# Patient Record
Sex: MALE | Race: WHITE | NOT HISPANIC OR LATINO | Employment: FULL TIME | URBAN - METROPOLITAN AREA
[De-identification: names, ages, dates, MRNs, and addresses within clinical notes are randomized per-mention and may not be internally consistent; named-entity substitution may affect disease eponyms.]

---

## 2017-06-04 ENCOUNTER — HOSPITAL ENCOUNTER (OUTPATIENT)
Dept: RADIOLOGY | Facility: HOSPITAL | Age: 42
Discharge: HOME | End: 2017-06-04
Attending: NURSE PRACTITIONER
Payer: COMMERCIAL

## 2017-06-04 ENCOUNTER — OFFICE VISIT (OUTPATIENT)
Dept: INTERNAL MEDICINE | Facility: CLINIC | Age: 42
End: 2017-06-04
Payer: COMMERCIAL

## 2017-06-04 VITALS
TEMPERATURE: 99 F | HEIGHT: 78 IN | DIASTOLIC BLOOD PRESSURE: 78 MMHG | SYSTOLIC BLOOD PRESSURE: 133 MMHG | HEART RATE: 69 BPM | WEIGHT: 242.5 LBS | BODY MASS INDEX: 28.06 KG/M2

## 2017-06-04 DIAGNOSIS — A08.4 VIRAL GASTROENTERITIS: ICD-10-CM

## 2017-06-04 DIAGNOSIS — A08.4 VIRAL GASTROENTERITIS: Primary | ICD-10-CM

## 2017-06-04 LAB
BILIRUB SERPL-MCNC: NORMAL MG/DL
BLOOD URINE, POC: NORMAL
COLOR, POC UA: NORMAL
GLUCOSE UR QL STRIP: NORMAL
KETONES UR QL STRIP: NORMAL
LEUKOCYTE ESTERASE URINE, POC: NORMAL
NITRITE, POC UA: NORMAL
PH, POC UA: 7
PROTEIN, POC: NORMAL
SPECIFIC GRAVITY, POC UA: 1
UROBILINOGEN, POC UA: NORMAL

## 2017-06-04 PROCEDURE — 74020 XR ABDOMEN FLAT AND ERECT: CPT | Mod: 26,,, | Performed by: RADIOLOGY

## 2017-06-04 PROCEDURE — 99999 PR PBB SHADOW E&M-EST. PATIENT-LVL V: CPT | Mod: PBBFAC,,, | Performed by: NURSE PRACTITIONER

## 2017-06-04 PROCEDURE — 99214 OFFICE O/P EST MOD 30 MIN: CPT | Mod: 25,S$GLB,, | Performed by: NURSE PRACTITIONER

## 2017-06-04 PROCEDURE — 81002 URINALYSIS NONAUTO W/O SCOPE: CPT | Mod: S$GLB,,, | Performed by: NURSE PRACTITIONER

## 2017-06-04 PROCEDURE — 74020 XR ABDOMEN FLAT AND ERECT: CPT | Mod: TC

## 2017-06-04 NOTE — PATIENT INSTRUCTIONS

## 2017-06-04 NOTE — PROGRESS NOTES
Subjective:       Patient ID: Tabitha Fairchild is a 41 y.o. male.    Chief Complaint: Headache; Nausea; and Chills    Headache    This is a new problem. The current episode started in the past 7 days (3d). The problem occurs constantly. The problem has been resolved. The pain is located in the frontal region. The pain does not radiate. The pain quality is not similar to prior headaches. The quality of the pain is described as aching. The patient is experiencing no pain. Associated symptoms include abdominal pain (epigastric), eye redness, a fever and nausea (minor). Pertinent negatives include no back pain, coughing, dizziness, ear pain, neck pain, numbness, photophobia, sinus pressure, sore throat or vomiting.   Nausea   This is a new problem. The current episode started in the past 7 days (3d). Associated symptoms include abdominal pain (epigastric), chills, fatigue, a fever, headaches (on and off) and nausea (minor). Pertinent negatives include no arthralgias, chest pain, congestion, coughing, myalgias, neck pain, numbness, rash, sore throat or vomiting.   Fatigue   This is a new problem. The current episode started in the past 7 days (3d). The problem occurs constantly. The problem has been gradually worsening. Associated symptoms include abdominal pain (epigastric), chills, fatigue, a fever, headaches (on and off) and nausea (minor). Pertinent negatives include no arthralgias, chest pain, congestion, coughing, myalgias, neck pain, numbness, rash, sore throat or vomiting.     Review of Systems   Constitutional: Positive for chills, fatigue and fever. Negative for activity change, appetite change (decreased) and unexpected weight change.   HENT: Negative for congestion, ear discharge, ear pain, nosebleeds, sinus pressure, sneezing and sore throat.    Eyes: Positive for redness. Negative for photophobia, discharge, itching and visual disturbance.   Respiratory: Negative for cough, chest tightness, shortness of breath,  wheezing and stridor.    Cardiovascular: Negative for chest pain, palpitations and leg swelling.   Gastrointestinal: Positive for abdominal distention (minorimproved), abdominal pain (epigastric), constipation (yesterday, had a bm but was a little better today) and nausea (minor). Negative for blood in stool, diarrhea and vomiting.   Genitourinary: Negative for dysuria, frequency, hematuria, scrotal swelling, testicular pain and urgency.   Musculoskeletal: Negative for arthralgias, back pain, myalgias, neck pain and neck stiffness.   Skin: Negative for rash and wound.   Neurological: Positive for headaches (on and off). Negative for dizziness, light-headedness and numbness.   Hematological: Negative for adenopathy. Does not bruise/bleed easily.   Psychiatric/Behavioral: Negative for suicidal ideas.       Objective:    Physical by TIFFANIE Gonzales    Physical Exam   Constitutional: He is oriented to person, place, and time. Vital signs are normal. He appears well-developed and well-nourished. He is cooperative.  Non-toxic appearance. He does not have a sickly appearance. He does not appear ill. No distress.   HENT:   Head: Normocephalic and atraumatic.   Right Ear: Hearing, tympanic membrane, external ear and ear canal normal.   Left Ear: Hearing, tympanic membrane, external ear and ear canal normal.   Nose: Nose normal.   Mouth/Throat: Oropharynx is clear and moist.   Eyes: Conjunctivae and EOM are normal. Pupils are equal, round, and reactive to light. Right eye exhibits no discharge. Left eye exhibits no discharge. No scleral icterus.   Neck: Normal range of motion. Neck supple.   Cardiovascular: Normal rate, regular rhythm, normal heart sounds and intact distal pulses.  Exam reveals no gallop and no friction rub.    No murmur heard.  Pulmonary/Chest: Effort normal and breath sounds normal. No respiratory distress. He has no decreased breath sounds. He has no wheezes. He has no rhonchi. He has no rales. He  exhibits no tenderness.   Abdominal: Soft. Bowel sounds are normal. He exhibits no distension. There is no guarding.   Musculoskeletal: Normal range of motion.   Neurological: He is alert and oriented to person, place, and time. He has normal reflexes. No cranial nerve deficit. Coordination normal.   Skin: Skin is warm. He is diaphoretic (mild).       Recent Results (from the past 24 hour(s))   POCT urine dipstick without microscope    Collection Time: 06/04/17  2:50 PM   Result Value Ref Range    Color, UA straw     Spec Grav UA 1.000     pH, UA 7     WBC, UA n     Nitrite, UA n     Protein n     Glucose, UA n     Ketones, UA n     Urobilinogen, UA n     Bilirubin n     Blood, UA n    CBC Without Differential    Collection Time: 06/04/17  3:21 PM   Result Value Ref Range    WBC 4.68 3.90 - 12.70 K/uL    RBC 5.00 4.60 - 6.20 M/uL    Hemoglobin 14.3 14.0 - 18.0 g/dL    Hematocrit 43.5 40.0 - 54.0 %    MCV 87 82 - 98 fL    MCH 28.6 27.0 - 31.0 pg    MCHC 32.9 32.0 - 36.0 %    RDW 13.1 11.5 - 14.5 %    Platelets 157 150 - 350 K/uL    MPV 10.1 9.2 - 12.9 fL   Comprehensive metabolic panel    Collection Time: 06/04/17  3:21 PM   Result Value Ref Range    Sodium 137 136 - 145 mmol/L    Potassium 4.0 3.5 - 5.1 mmol/L    Chloride 104 95 - 110 mmol/L    CO2 25 23 - 29 mmol/L    Glucose 88 70 - 110 mg/dL    BUN, Bld 9 6 - 20 mg/dL    Creatinine 0.9 0.5 - 1.4 mg/dL    Calcium 8.9 8.7 - 10.5 mg/dL    Total Protein 7.1 6.0 - 8.4 g/dL    Albumin 3.8 3.5 - 5.2 g/dL    Total Bilirubin 0.8 0.1 - 1.0 mg/dL    Alkaline Phosphatase 73 55 - 135 U/L    AST 17 10 - 40 U/L    ALT 15 10 - 44 U/L    Anion Gap 8 8 - 16 mmol/L    eGFR if African American >60.0 >60 mL/min/1.73 m^2    eGFR if non African American >60.0 >60 mL/min/1.73 m^2   Amylase    Collection Time: 06/04/17  3:21 PM   Result Value Ref Range    Amylase 18 (L) 20 - 110 U/L   Lipase    Collection Time: 06/04/17  3:21 PM   Result Value Ref Range    Lipase 13 4 - 60 U/L  "      Assessment:       1. Viral gastroenteritis        Plan:       Tabitha was seen today for headache, nausea and chills.    Diagnoses and all orders for this visit:    Viral gastroenteritis  -     POCT urine dipstick without microscope  -     CBC Without Differential; Future  -     Comprehensive metabolic panel; Future  -     Amylase; Future  -     Lipase; Future  -     X-Ray Abdomen Flat And Erect; Future  -     sodium chloride 0.9% bolus 1,000 mL; Inject 1,000 mLs into the vein one time.    Labs indicate no infection, no anemia, no kidney or liver dysfunction, no transaminitis, no disruption of blood electrolytes.  Awaiting abd xray.  Urine is normal.    Pt has been given instructions populated from [employer] database and has verbalized understanding of the after visit summary and information contained wherein.    Follow up with a primary care provider. May go to ER for acute shortness of breath, lightheadedness, fever, or any other emergent complaints or changes in condition.    "This note will be shared with the patient"    The [employer] medical Dictation software was used during the dictation of portions or the entirety of this medical record.  Phonetic or grammatic errors may exist due to the use of this software. For clarification, refer to the author of the document.             "

## 2017-06-04 NOTE — PROGRESS NOTES
Initiated peripheral IV access using 22G catheter.  Procedure tolerated well by patient.  Administering 1L NS bolus

## 2017-06-07 ENCOUNTER — OFFICE VISIT (OUTPATIENT)
Dept: INTERNAL MEDICINE | Facility: CLINIC | Age: 42
End: 2017-06-07
Payer: COMMERCIAL

## 2017-06-07 VITALS
HEART RATE: 62 BPM | SYSTOLIC BLOOD PRESSURE: 114 MMHG | DIASTOLIC BLOOD PRESSURE: 64 MMHG | WEIGHT: 242.94 LBS | HEIGHT: 78 IN | BODY MASS INDEX: 28.11 KG/M2

## 2017-06-07 DIAGNOSIS — A08.4 VIRAL GASTROENTERITIS: Primary | ICD-10-CM

## 2017-06-07 DIAGNOSIS — R25.2 MUSCLE CRAMPS: ICD-10-CM

## 2017-06-07 DIAGNOSIS — Z13.6 SCREENING FOR CARDIOVASCULAR CONDITION: ICD-10-CM

## 2017-06-07 DIAGNOSIS — R12 HEARTBURN: ICD-10-CM

## 2017-06-07 PROCEDURE — 99999 PR PBB SHADOW E&M-EST. PATIENT-LVL III: CPT | Mod: PBBFAC,,, | Performed by: INTERNAL MEDICINE

## 2017-06-07 PROCEDURE — 99215 OFFICE O/P EST HI 40 MIN: CPT | Mod: S$GLB,,, | Performed by: INTERNAL MEDICINE

## 2017-06-07 RX ORDER — ACETAMINOPHEN 500 MG
500 TABLET ORAL EVERY 6 HOURS PRN
COMMUNITY

## 2017-06-07 NOTE — PATIENT INSTRUCTIONS
Please try using over-the-counter Pepcid or Zantac, two times a day before meals. Use for 1-2 weeks to keep the acid levels low to let the stomach heal up any irritation. When you do restart coffee, at first to limit 1 cup daily for at least 1 week.    Please make sure to get a full glass of water with every meal. Please also drink another 3-4 glasses of water throughout the day in between meals (aim for 6-8 glasses of water per day).

## 2017-06-07 NOTE — PROGRESS NOTES
"Subjective:       Patient ID: Tabitha Fairchild is a 41 y.o. male.    Chief Complaint: Establish Care    HPI    Patient is accompanied by his wife and son.    No prior visits with me, seen in UC for gastroenteritis last week. Fri & Sat bloated and stomach cramps. Had IVF for dizziness on Sun. , does a lot of outdoor training. No nausea or vomiting. Drinks a lot of coffee. Feeling better now.     Also with mm cramps. "very tight". Unable to sleep, ahd to stretch.     constipation on Sunday. yesterday pale stools. About 1 yr ago had bad stomach symptoms, seen with white stools then. No diarrhea. No blood in stool. Heartburn every so often, uses Tums. Had f/ch on Sat & Sun, 99 deg temp.     Review of Systems   All other systems reviewed and are negative.      Objective:      Physical Exam   Constitutional: He is oriented to person, place, and time. No distress.    man whose Body mass index is 28.08 kg/m².    HENT:   Head: Atraumatic.   Right Ear: Tympanic membrane normal.   Left Ear: Tympanic membrane normal.   Mouth/Throat: Oropharynx is clear and moist. No oropharyngeal exudate.   Eyes: Pupils are equal, round, and reactive to light. Right eye exhibits no discharge. Left eye exhibits no discharge.   Neck: Normal range of motion. No thyromegaly present.   Cardiovascular: Normal rate, regular rhythm and normal heart sounds.    Pulmonary/Chest: Effort normal and breath sounds normal. He has no wheezes. He has no rales.   Abdominal: Soft. He exhibits no distension and no mass. There is no hepatosplenomegaly. There is no tenderness. There is no rigidity, no guarding and negative Verma's sign.   Musculoskeletal: He exhibits no edema or tenderness.   Lymphadenopathy:     He has no cervical adenopathy.   Neurological: He is alert and oriented to person, place, and time.   Skin: Skin is warm and dry. No rash noted.   Psychiatric: He has a normal mood and affect. His behavior is normal.   Nursing note and " "vitals reviewed.      Vitals:    06/07/17 1132 06/07/17 1213   BP: 131/78 114/64   BP Location: Right arm Right arm   Patient Position: Sitting Sitting   BP Method: Manual Manual   Pulse: 62    Weight: 110.2 kg (242 lb 15.2 oz)    Height: 6' 6" (1.981 m)      Body mass index is 28.08 kg/m².    I have personally checked the blood pressure and documented my findings.     RESULTS: Reviewed labs from last 2 months    Assessment:       1. Viral gastroenteritis    2. Heartburn    3. Muscle cramps    4. Screening for cardiovascular condition        Plan:   Tabitha was seen today for establish care.    Diagnoses and all orders for this visit:    Viral gastroenteritis:  symptoms improving, consistent with viral etiology, rule out electrolyte loss given diarrhea. Avoid constipation with fluids:  "Please make sure to get a full glass of water with every meal. Please also drink another 3-4 glasses of water throughout the day in between meals (aim for 6-8 glasses of water per day)."  -     Magnesium; Future  -     Basic metabolic panel; Future    Heartburn:  over-the-counter H2RA PRN:  "Please try using over-the-counter Pepcid or Zantac, two times a day before meals. Use for 1-2 weeks to keep the acid levels low to let the stomach heal up any irritation. When you do restart coffee, at first to limit 1 cup daily for at least 1 week."    Muscle cramps:  possibly due to overuse combined with electrolyte loss from diarrhea, check Mg. Encourage gentle stretching before and after exercise, plenty of fluids.  -     Magnesium; Future    Screening for cardiovascular condition  -     Lipid panel; Future    Return in about 4 months (around 10/7/2017) for fasting labs 1 week prior.  Geovani Li MD  Internal Medicine    Portions of this note were completed using its learning dictation software. Please excuse typographical or syntax errors.   "

## 2017-09-28 ENCOUNTER — PATIENT MESSAGE (OUTPATIENT)
Dept: INTERNAL MEDICINE | Facility: CLINIC | Age: 42
End: 2017-09-28

## 2017-09-28 ENCOUNTER — OFFICE VISIT (OUTPATIENT)
Dept: INTERNAL MEDICINE | Facility: CLINIC | Age: 42
End: 2017-09-28
Payer: COMMERCIAL

## 2017-09-28 VITALS
DIASTOLIC BLOOD PRESSURE: 78 MMHG | BODY MASS INDEX: 28.77 KG/M2 | SYSTOLIC BLOOD PRESSURE: 134 MMHG | WEIGHT: 248.69 LBS | HEART RATE: 60 BPM | HEIGHT: 78 IN

## 2017-09-28 DIAGNOSIS — M77.11 LATERAL EPICONDYLITIS, RIGHT ELBOW: Primary | ICD-10-CM

## 2017-09-28 DIAGNOSIS — J01.10 ACUTE NON-RECURRENT FRONTAL SINUSITIS: ICD-10-CM

## 2017-09-28 PROCEDURE — 3008F BODY MASS INDEX DOCD: CPT | Mod: S$GLB,,, | Performed by: INTERNAL MEDICINE

## 2017-09-28 PROCEDURE — 99999 PR PBB SHADOW E&M-EST. PATIENT-LVL III: CPT | Mod: PBBFAC,,, | Performed by: INTERNAL MEDICINE

## 2017-09-28 PROCEDURE — 99214 OFFICE O/P EST MOD 30 MIN: CPT | Mod: S$GLB,,, | Performed by: INTERNAL MEDICINE

## 2017-09-28 NOTE — PROGRESS NOTES
Subjective:       Patient ID: Tabitha Fairchild is a 41 y.o. male.    Chief Complaint: Elbow Pain    HPI    Last visit with me 06/2017.  Patient reports that over a month ago developed pain in his right elbow.  Was given some medications by an outside orthopedist with some relief, some type of anti-inflammatory but not sure if this was steroid or NSAID based.  Arm is not improving.  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  Pain recurs when serving the ball.  Also notes that moving from flexion to extension causes soreness.  Sudden movements also increase the pain.  He has tried ice without significant improvement.  Denies any trauma to the elbow, no history of elbow problems.  No paresthesias in the arm or hand, no problem with use of the hand.    About one week ago he also developed a sinus headache, noted tenderness on the left side of the forehead by the eyebrow.  Symptoms today are starting to resolve.  He does have known seasonal ALLERGIES.  Denies any nasal drainage, vision problems, change in hearing.  Denies any fever or chills.    Review of Systems    As per HPI    Objective:      Physical Exam   Constitutional: He is oriented to person, place, and time. No distress.    man whose Body mass index is 28.74 kg/m².    HENT:   Right Ear: Tympanic membrane normal.   Left Ear: Tympanic membrane normal.   Nose: Right sinus exhibits no maxillary sinus tenderness and no frontal sinus tenderness. Left sinus exhibits frontal sinus tenderness (mildly tender to deep palpation). Left sinus exhibits no maxillary sinus tenderness.   Eyes: Conjunctivae and EOM are normal. Pupils are equal, round, and reactive to light. Right eye exhibits no discharge. Left eye exhibits no discharge.   Neck: Normal range of motion. No thyromegaly present.   Cardiovascular: Normal rate and regular rhythm.    Pulses:       Femoral pulses are 2+ on  "the right side.  Pulmonary/Chest: No stridor.   Musculoskeletal:   Tender to deep palpation at lateral upper condyle of the right elbow.  Pain increases with supination of hand against resistance.  Full range of motion at elbow without crepitus or catching.   Lymphadenopathy:     He has no cervical adenopathy.   Neurological: He is alert and oriented to person, place, and time.   Nursing note and vitals reviewed.      Vitals:    09/28/17 1043   BP: 134/78   BP Location: Right arm   Patient Position: Sitting   BP Method: Large (Manual)   Pulse: 60   Weight: 112.8 kg (248 lb 10.9 oz)   Height: 6' 6" (1.981 m)     Body mass index is 28.74 kg/m².    RESULTS: Reviewed labs from last 12 months    Assessment:       1. Lateral epicondylitis, right elbow    2. Acute non-recurrent frontal sinusitis        Plan:   Tabitha was seen today for elbow pain.    Diagnoses and all orders for this visit:    Lateral epicondylitis, right elbow:  Refer to orthopedics for evaluation.  Patient will find the anti-inflammatory he was given in the past, once he does I will restart this for continued pain relief.  -     Ambulatory referral to Orthopedics    Acute non-recurrent frontal sinusitis:  Going on for about 1 week, starting to improve day, possibly ALLERGY related due to seasonal ALLERGIES, less likely bacterial sinusitis at this time given lack of fever, chills, or other signs of infection.  continue to monitor for now.  Please notify office if symptoms persist or worsen.    Return in about 4 months (around 1/28/2018) for fasting labs 1 week prior.  Geovani Li MD  Internal Medicine    Portions of this note were completed using [employer] dictation software. Please excuse typographical or syntax errors.   "

## 2017-09-29 RX ORDER — MELOXICAM 15 MG/1
15 TABLET ORAL DAILY
Qty: 14 TABLET | Refills: 0 | Status: SHIPPED | OUTPATIENT
Start: 2017-09-29 | End: 2017-10-02 | Stop reason: SDUPTHER

## 2017-10-02 ENCOUNTER — HOSPITAL ENCOUNTER (OUTPATIENT)
Dept: RADIOLOGY | Facility: HOSPITAL | Age: 42
Discharge: HOME | End: 2017-10-02
Attending: ORTHOPAEDIC SURGERY
Payer: COMMERCIAL

## 2017-10-02 ENCOUNTER — OFFICE VISIT (OUTPATIENT)
Dept: SPORTS MEDICINE | Facility: CLINIC | Age: 42
End: 2017-10-02
Payer: COMMERCIAL

## 2017-10-02 VITALS
BODY MASS INDEX: 28.46 KG/M2 | WEIGHT: 246 LBS | DIASTOLIC BLOOD PRESSURE: 85 MMHG | HEIGHT: 78 IN | SYSTOLIC BLOOD PRESSURE: 142 MMHG | HEART RATE: 60 BPM
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
## 2017-10-02 DIAGNOSIS — S46.911A OVERUSE SYNDROME OF ELBOW, RIGHT, INITIAL ENCOUNTER: Primary | ICD-10-CM

## 2017-10-02 DIAGNOSIS — M25.521 RIGHT ELBOW PAIN: ICD-10-CM

## 2017-10-02 DIAGNOSIS — M77.11 LATERAL EPICONDYLITIS, RIGHT ELBOW: ICD-10-CM

## 2017-10-02 PROCEDURE — 99999 PR PBB SHADOW E&M-EST. PATIENT-LVL III: CPT | Mod: PBBFAC,,, | Performed by: ORTHOPAEDIC SURGERY

## 2017-10-02 PROCEDURE — 73080 X-RAY EXAM OF ELBOW: CPT | Mod: 26,RT,, | Performed by: RADIOLOGY

## 2017-10-02 PROCEDURE — 3008F BODY MASS INDEX DOCD: CPT | Mod: S$GLB,,, | Performed by: ORTHOPAEDIC SURGERY

## 2017-10-02 PROCEDURE — 73080 X-RAY EXAM OF ELBOW: CPT | Mod: TC,PO,RT

## 2017-10-02 PROCEDURE — 99204 OFFICE O/P NEW MOD 45 MIN: CPT | Mod: S$GLB,,, | Performed by: ORTHOPAEDIC SURGERY

## 2017-10-02 RX ORDER — MELOXICAM 15 MG/1
15 TABLET ORAL DAILY
Qty: 60 TABLET | Refills: 2 | Status: SHIPPED | OUTPATIENT
Start: 2017-10-02

## 2017-10-02 NOTE — LETTER
October 2, 2017      Geovani Li MD  [address]  [facility] [address]           [facility]  [address]  [facility] [address]  Phone: [phone]          Patient: Tabitha Fairchild   MR Number: [id number]   YOB: 1975   Date of Visit: 10/2/2017       Dear Dr. Geovani Li:    Thank you for referring Tabitha Fairchild to me for evaluation. Attached you will find relevant portions of my assessment and plan of care.    If you have questions, please do not hesitate to call me. I look forward to following Tabitha Fairchild along with you.    Sincerely,    LORENA Beach MD    Enclosure  CC:  No Recipients    If you would like to receive this communication electronically, please contact [email or web address] or [phone] to request more information on [employer] access.    For providers and/or their staff who would like to refer a patient to [facility], please contact us through our one-stop-shop provider referral line, [facility] , at [phone].    If you feel you have received this communication in error or would no longer like to receive these types of communications, please e-mail [email or web address]

## 2017-10-02 NOTE — PROGRESS NOTES
CC: RIGHT elbow pain     41 y.o. 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 Played professional volleyball in [place]. Pain in lateral elbow for about 1 month. No distinct SYDNEE. Pain with carrying his daughter, pain with hitting/serving. Meloxicam for the past 2 days for pain prescribed by PCP which has given him significant relief. Pain not particular noticeable at this time.     Typical pain localized posterolaterally. Usually with ball strike with brought into forceful extension. Sharp pain. Has had a similar problem earlier in playing career but self resolved with rest. No numbness or tingling. No instability symptoms.     Can affect ADLs.     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    Pain Score:   1    PAST MEDICAL HISTORY:   Past Medical History:   Diagnosis Date    Anal fissure     Hemorrhoids     Seasonal allergies 3/18/2016       PAST SURGICAL HISTORY:  History reviewed. No pertinent surgical history.    FAMILY HISTORY:  Family History   Problem Relation Age of Onset    No Known Problems Mother     Hypertension Father     Hypertension Paternal Grandfather        MEDICATIONS:    Current Outpatient Prescriptions:     acetaminophen (TYLENOL EXTRA STRENGTH) 500 MG tablet, Take 500 mg by mouth every 6 (six) hours as needed for Pain., Disp: , Rfl:     meloxicam (MOBIC) 15 MG tablet, Take 1 tablet (15 mg total) by mouth once daily., Disp: 14 tablet, Rfl: 0    multivitamin capsule, Take 1 capsule by mouth once daily., Disp: , Rfl:     ALLERGIES:  Review of patient's allergies indicates:   Allergen Reactions    Pcn [penicillins]           REVIEW OF SYSTEMS:  Constitution: Negative. Negative for chills, fever and night sweats.    Hematologic/Lymphatic: Negative for bleeding problem. Does not bruise/bleed easily.   Skin: Negative for dry skin, itching and rash.   Musculoskeletal:  "Negative for falls. Positive for right shoulder pain and  muscle weakness.     PHYSICAL EXAMINATION:  Vitals:  BP (!) 142/85   Pulse 60   Ht 6' 6" (1.981 m)   Wt 111.6 kg (246 lb)   BMI 28.43 kg/m²    General: Well-developed well-nourished 41 y.o. malein no acute distress   Cardiovascular: Regular rhythm by palpation of distal pulse, normal color and temperature, no concerning varicosities on symptomatic side   Lungs: No labored breathing or wheezing appreciated   Neuro: Alert and oriented ×3   Psychiatric: well oriented to person, place and time, demonstrates normal mood and affect   Skin: No rashes, lesions or ulcers, normal temperature, turgor, and texture on uninvolved extremity            Right Elbow Exam     Comments:  Minimal soft tissue or bony tenderness on exam today.  Mild tenderness over the lateral epicondyle and common extensor bundle.  Minimal tenderness also over the volar mobile wad.  No crepitus or palpable instability over the radiocapitellar joint.  Minimal tenderness over the posterior medial and posterior lateral ulnar humeral articulation.  Full active extension, flexion to 130 without pain.  Ligament is stable.  No pain with resisted wrist extension or flexion.  No tenderness of the posterior medial elbow and negative Tinel sign at the cubital tunnel. Negative milking maneuver for UCL deficiency. No point tenderness over the olecranon or triceps.       Left Elbow Exam     Comments:  Painless full range of motion.  No focal tenderness.            IMAGING:  Xrays including AP, Outlet and Axillary Lateral of RIGHT elbow are ordered / images reviewed by me: No acute findings, no degenerative disease, alignment is appropriate.    ASSESSMENT:      ICD-10-CM ICD-9-CM   1. Overuse syndrome of elbow, right, initial encounter S56.911A 841.9   2. Lateral epicondylitis, right elbow M77.11 726.32   3. Right elbow pain M25.521 719.42     Valgus extension overload syndrome    PLAN:      Findings were " reviewed with the patient and his wife.  Pain is positional and associated with ball strike in particular on the volleyball court.  He has localized typical symptoms posterior lateral and over the RC joint. Worse with forced extension consistent with impingement. Given the extent and duration of his complaints and desire for unrestricted activity on the volleyball court, I've recommended an MRI for further evaluation. RTC after study to discuss results. Until then, continue Mobic which seems to be helping.       Procedures

## 2017-10-26 ENCOUNTER — PATIENT MESSAGE (OUTPATIENT)
Dept: SPORTS MEDICINE | Facility: CLINIC | Age: 42
End: 2017-10-26

## 2017-10-26 ENCOUNTER — PATIENT MESSAGE (OUTPATIENT)
Dept: INTERNAL MEDICINE | Facility: CLINIC | Age: 42
End: 2017-10-26

## 2017-10-26 ENCOUNTER — TELEPHONE (OUTPATIENT)
Dept: SPORTS MEDICINE | Facility: CLINIC | Age: 42
End: 2017-10-26

## 2017-10-26 NOTE — TELEPHONE ENCOUNTER
Spoke w/ Tabitha regarding his portal message. R/S his MRI for Wednesday 11/1, RTC Thursday for an MRI review.

## 2017-11-01 ENCOUNTER — HOSPITAL ENCOUNTER (OUTPATIENT)
Dept: RADIOLOGY | Facility: HOSPITAL | Age: 42
Discharge: HOME | End: 2017-11-01
Attending: ORTHOPAEDIC SURGERY
Payer: COMMERCIAL

## 2017-11-01 DIAGNOSIS — M77.11 LATERAL EPICONDYLITIS, RIGHT ELBOW: ICD-10-CM

## 2017-11-01 PROCEDURE — 73221 MRI JOINT UPR EXTREM W/O DYE: CPT | Mod: TC,RT

## 2017-11-01 PROCEDURE — 73221 MRI JOINT UPR EXTREM W/O DYE: CPT | Mod: 26,RT,, | Performed by: RADIOLOGY

## 2017-11-02 ENCOUNTER — OFFICE VISIT (OUTPATIENT)
Dept: SPORTS MEDICINE | Facility: CLINIC | Age: 42
End: 2017-11-02
Payer: COMMERCIAL

## 2017-11-02 VITALS
DIASTOLIC BLOOD PRESSURE: 84 MMHG | SYSTOLIC BLOOD PRESSURE: 139 MMHG | BODY MASS INDEX: 28.46 KG/M2 | WEIGHT: 246 LBS | HEIGHT: 78 IN

## 2017-11-02 DIAGNOSIS — M25.521 RIGHT ELBOW PAIN: Primary | ICD-10-CM

## 2017-11-02 DIAGNOSIS — M77.11 LATERAL EPICONDYLITIS OF RIGHT ELBOW: ICD-10-CM

## 2017-11-02 DIAGNOSIS — M93.20 OSTEOCHONDRITIS DISSECANS OF CAPITELLUM OF RIGHT UPPER EXTREMITY: ICD-10-CM

## 2017-11-02 PROCEDURE — 99213 OFFICE O/P EST LOW 20 MIN: CPT | Mod: 25,S$GLB,, | Performed by: ORTHOPAEDIC SURGERY

## 2017-11-02 PROCEDURE — 20605 DRAIN/INJ JOINT/BURSA W/O US: CPT | Mod: RT,S$GLB,, | Performed by: ORTHOPAEDIC SURGERY

## 2017-11-02 PROCEDURE — 99999 PR PBB SHADOW E&M-EST. PATIENT-LVL III: CPT | Mod: PBBFAC,,, | Performed by: ORTHOPAEDIC SURGERY

## 2017-11-02 RX ORDER — TRIAMCINOLONE ACETONIDE 40 MG/ML
40 INJECTION, SUSPENSION INTRA-ARTICULAR; INTRAMUSCULAR
Status: DISCONTINUED | OUTPATIENT
Start: 2017-11-02 | End: 2017-11-02 | Stop reason: HOSPADM

## 2017-11-02 RX ADMIN — TRIAMCINOLONE ACETONIDE 40 MG: 40 INJECTION, SUSPENSION INTRA-ARTICULAR; INTRAMUSCULAR at 09:11

## 2017-11-02 NOTE — PROGRESS NOTES
"CC: RIGHT elbow pain     42 y.o. 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 Played professional volleyball in [place]. Pain in lateral elbow x2 months with no discrete injury or truama. Last seen in clinic one month ago & MRI was ordered. Due to improved symptoms with meloxicam, he cancelled the MRI. Unfortunately, symptoms returned so he proceeded with and r/s the MRI and is here today to review the images and discuss possible treatment options. Typical pain localized posterolaterally. Usually with ball strike and rapid flexion to extension moment in elbow. Pain with terminal flexion and extension and supination movements. Has had a similar problem earlier in playing career but self resolved with rest. No numbness or tingling. No instability symptoms.  No instability symptoms.  Pain usually better predictably when not practicing or competing.  Primary exacerbating activity is the volleyball participation    Denies any changes to his past medical history, past surgical history, family history, social history, medications and allergies.     REVIEW OF SYSTEMS:  Constitution: Negative. Negative for chills, fever and night sweats.    Hematologic/Lymphatic: Negative for bleeding problem. Does not bruise/bleed easily.   Skin: Negative for dry skin, itching and rash.   Musculoskeletal: Negative for falls. Positive for right shoulder pain and  muscle weakness.     PHYSICAL EXAMINATION:  Vitals:  /84   Ht 6' 6" (1.981 m)   Wt 111.6 kg (246 lb)   BMI 28.43 kg/m²    General: Well-developed well-nourished 42 y.o. malein no acute distress   Cardiovascular: Regular rhythm by palpation of distal pulse, normal color and temperature, no concerning varicosities on symptomatic side   Lungs: No labored breathing or wheezing appreciated   Neuro: Alert and oriented ×3   Psychiatric: well oriented to person, place and time, demonstrates normal mood and affect   Skin: No rashes, lesions or " ulcers, normal temperature, turgor, and texture on uninvolved extremity    Ortho/SPM Exam  Focal exam of the right elbow today demonstrates again focal tenderness prominently over the lateral epicondyle and common extensor bundle.  Minimal tenderness over the radiocapitellar joint.  No palpable crepitus over the radiocapitellar joint with forearm supination and pronation.  Mild laterally based pain with resisted wrist extension.  No medial elbow tenderness.  Full active flexion and extension of the elbow.  No posterior medial tenderness.  No ulnar nerve symptoms.  Ligamentously stable    IMAGING:  Xrays including AP, Outlet and Axillary Lateral of RIGHT elbow are ordered / images reviewed by me: No acute findings, no degenerative disease, alignment is appropriate.    MRI was reviewed by me with the patient. Images show:   1. Limited study due to motion artifact.  Minimal signal abnormality at the origin of the common extensor tendon suggestive of tendinosis or minimal partial tear.    On my read the patient has a small OCD lesion of the lateral aspect capitellum with more generalized capitellar chondromalacia. UCL intact.  No appreciable inflammation over the posterior medial olecranon fossa    ASSESSMENT:      ICD-10-CM ICD-9-CM   1. Right elbow pain M25.521 719.42   2. Lateral epicondylitis of right elbow M77.11 726.32   3. 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  Symptoms better and quite manageable with activity modifications on the volleyball court and use of Mobic.  He will continue with these measures and also would like to proceed with a local steroid injection today.  Discussed an eccentric stretching program and also had him fitted for a counterforce strap.  I'll see him back in 6 weeks as needed.  Surgery is an option if pain is persistent or recurrent despite these measures.      R lateral  epicondyleIntermediate Joint Aspiration/Injection  Date/Time: 11/2/2017 9:52 AM  Performed by: LORENA GUERRERO  Authorized by: LORENA GUERRERO     Timeout: Prior to procedure the correct patient, procedure, and site was verified     Location:  Elbow  Site:  R elbow  Prep: Patient was prepped and draped in usual sterile fashion    Needle size:  22 G  Medications:  40 mg triamcinolone acetonide 40 mg/mL  Patient tolerance:  Patient tolerated the procedure well with no immediate complications

## 2018-05-18 ENCOUNTER — OFFICE VISIT (OUTPATIENT)
Dept: INTERNAL MEDICINE | Facility: CLINIC | Age: 43
End: 2018-05-18
Payer: COMMERCIAL

## 2018-05-18 VITALS
HEART RATE: 59 BPM | DIASTOLIC BLOOD PRESSURE: 74 MMHG | BODY MASS INDEX: 30.15 KG/M2 | HEIGHT: 78 IN | SYSTOLIC BLOOD PRESSURE: 114 MMHG | WEIGHT: 260.56 LBS

## 2018-05-18 DIAGNOSIS — G89.29 CHRONIC PAIN OF RIGHT KNEE: ICD-10-CM

## 2018-05-18 DIAGNOSIS — M25.561 CHRONIC PAIN OF RIGHT KNEE: ICD-10-CM

## 2018-05-18 DIAGNOSIS — M77.11 LATERAL EPICONDYLITIS OF BOTH ELBOWS: ICD-10-CM

## 2018-05-18 DIAGNOSIS — M77.12 LATERAL EPICONDYLITIS OF BOTH ELBOWS: ICD-10-CM

## 2018-05-18 DIAGNOSIS — R22.1 NECK NODULE: Primary | ICD-10-CM

## 2018-05-18 PROCEDURE — 99999 PR PBB SHADOW E&M-EST. PATIENT-LVL III: CPT | Mod: PBBFAC,,, | Performed by: INTERNAL MEDICINE

## 2018-05-18 PROCEDURE — 99214 OFFICE O/P EST MOD 30 MIN: CPT | Mod: S$GLB,,, | Performed by: INTERNAL MEDICINE

## 2018-05-18 PROCEDURE — 3008F BODY MASS INDEX DOCD: CPT | Mod: CPTII,S$GLB,, | Performed by: INTERNAL MEDICINE

## 2018-05-18 NOTE — PROGRESS NOTES
"Subjective:       Patient ID: Tabitha Fairchild is a 42 y.o. male.    Chief Complaint: Neck Pain (right side )    HPI    Last visit with me 09/2017. Since then seen by Sports Med.     Three or four days ago the patient noted a nodule in the right side of his neck.  It was associated with some swelling, and he noticed it more when swallowing.  It has been improving over the last 24 hr.  No other symptoms.  No changes in salivation, no sore throat or sinus congestion. No ear pain. Patient reports some seasonal allergy symptoms with sneezing going on, but not daily.  This was never a problem in the past.  He felt that there was some similar swelling in the neck, possibly lymph node, that happened six months ago but only lasted one day.  Denies lymphadenopathy in any other parts of the body.  He denies any symptoms consistent with hyper or hypothyroidism.  Denies being on any new medications.  No fevers, no chills.    The still with some bilateral elbow pain.  Pain on the right responded well to injection from Sports Medicine, but now developing some pain similarly in the left elbow.  Diagnosed with lateral epicondylitis in the past, likely due to athletic activity.      Patient has occasional right knee pain.  Had meniscus removal in the past, he no falls or severe limitation.  Describes "pinch" feeling.    Review of Systems    As per HPI    Objective:      Physical Exam   Constitutional: He is oriented to person, place, and time. No distress.    man whose Body mass index is 30.5 kg/m².    HENT:   Head: Normocephalic and atraumatic.   Right Ear: Tympanic membrane and external ear normal.   Left Ear: Tympanic membrane and external ear normal.   Mouth/Throat: Oropharynx is clear and moist. No oropharyngeal exudate.   Neck: No thyromegaly present.   No posterior cervical lymphadenopathy.   Pulmonary/Chest: No stridor.   Lymphadenopathy:     He has cervical adenopathy (movable, nontender LN in R anterior neck <1 cm in " "size ).   Neurological: He is alert and oriented to person, place, and time.   Skin: Skin is warm and dry. No rash noted.   Nursing note and vitals reviewed.      Vitals:    05/18/18 1013   BP: 114/74   BP Location: Right arm   Patient Position: Sitting   BP Method: Large (Manual)   Pulse: (!) 59   Weight: 118.2 kg (260 lb 9.3 oz)   Height: 6' 5.5" (1.969 m)     Body mass index is 30.5 kg/m².    RESULTS: Reviewed labs from last 12 months    Assessment:       1. Neck nodule    2. Lateral epicondylitis of both elbows    3. Chronic pain of right knee        Plan:   Tabitha was seen today for neck pain.    Diagnoses and all orders for this visit:    Neck nodule:  New problem.  Going on for a few days but now resolving.  Likely swollen lymph node.  No other nodularity.  Unlikely thyroid related.  If symptoms persist or recur, notify the office for evaluation with ultrasound.  No further evaluation required at this time.    Lateral epicondylitis of both elbows:  Recurring problem, patient is evaluated by Sports Medicine, continue follow-up with their service as needed.    Chronic pain of right knee:  Longstanding problem, had meniscus injury in the past.  Encourage weight loss and continued follow-up with Sports Medicine as needed.    Follow-up in about 1 year (around 5/18/2019) for follow up visit.  Geovani Li MD  Internal Medicine    Portions of this note were completed using medical dictation software. Please excuse typographical or syntax errors that were missed on review.    "

## 2018-09-18 ENCOUNTER — OFFICE VISIT (OUTPATIENT)
Dept: PHYSICAL MEDICINE AND REHAB | Facility: CLINIC | Age: 43
End: 2018-09-18
Payer: COMMERCIAL

## 2018-09-18 VITALS
SYSTOLIC BLOOD PRESSURE: 184 MMHG | WEIGHT: 260.56 LBS | HEIGHT: 78 IN | BODY MASS INDEX: 30.15 KG/M2 | HEART RATE: 71 BPM | DIASTOLIC BLOOD PRESSURE: 91 MMHG

## 2018-09-18 DIAGNOSIS — M77.12 LATERAL EPICONDYLITIS OF BOTH ELBOWS: Primary | ICD-10-CM

## 2018-09-18 DIAGNOSIS — M77.11 LATERAL EPICONDYLITIS OF BOTH ELBOWS: Primary | ICD-10-CM

## 2018-09-18 PROCEDURE — 99203 OFFICE O/P NEW LOW 30 MIN: CPT | Mod: 25,S$GLB,, | Performed by: PHYSICAL MEDICINE & REHABILITATION

## 2018-09-18 PROCEDURE — 3008F BODY MASS INDEX DOCD: CPT | Mod: CPTII,S$GLB,, | Performed by: PHYSICAL MEDICINE & REHABILITATION

## 2018-09-18 PROCEDURE — 99999 PR PBB SHADOW E&M-EST. PATIENT-LVL III: CPT | Mod: PBBFAC,,, | Performed by: PHYSICAL MEDICINE & REHABILITATION

## 2018-09-18 PROCEDURE — 76882 US LMTD JT/FCL EVL NVASC XTR: CPT | Mod: S$GLB,,, | Performed by: PHYSICAL MEDICINE & REHABILITATION

## 2018-09-18 NOTE — PROGRESS NOTES
[facility]    CHIEF COMPLAINT:   Chief Complaint   Patient presents with    Elbow Pain     bilateral/right worse     HISTORY OF PRESENT ILLNESS: Tabitha Fairchild is a 42 y.o. male who presents to me as a new patient for evaluation of bilateral elbow pain. Right sided pain one year ago and left sided pain 6 months ago, right worse than left. Received injection on right 6 months ago with good relief for 5 months. Works as a  which aggravates the pain, unable to get much rest. Pain is worse with serving and hitting. He describes lateral elbow pain bilaterally. No neck pain. No numbness or tingling in the fingers. Has not had PT. Does do a lot of stretching. He takes tylenol and aleeve with some relief. Also relief with ice. Pain currently rated at 3/10, dull and achy. Some popping and appreciates knots in the muscles. No swelling but clear TTP.     Review of Systems   Constitutional: Negative for fever.   HENT: Negative for drooling.    Eyes: Negative for discharge.   Respiratory: Negative for choking.    Cardiovascular: Negative for chest pain.   Genitourinary: Negative for flank pain.   Skin: Negative for wound.   Allergic/Immunologic: Negative for immunocompromised state.   Neurological: Negative for tremors and syncope.   Psychiatric/Behavioral: Negative for behavioral problems.     Past Medical History:   Past Medical History:   Diagnosis Date    Anal fissure     Hemorrhoids     Seasonal allergies 3/18/2016       Past Surgical History:   Past Surgical History:   Procedure Laterality Date    MENISCECTOMY Right 2000       Family History:   Family History   Problem Relation Age of Onset    No Known Problems Mother     Hypertension Father     Hypertension Paternal Grandfather        Medications:   Current Outpatient Medications on File Prior to Visit   Medication Sig Dispense Refill    acetaminophen (TYLENOL EXTRA STRENGTH) 500 MG tablet Take 500 mg by mouth every 6 (six) hours  "as needed for Pain.      naproxen sodium (ALEVE ORAL) Take by mouth as needed.      meloxicam (MOBIC) 15 MG tablet Take 1 tablet (15 mg total) by mouth once daily. 60 tablet 2    multivitamin capsule Take 1 capsule by mouth once daily.       No current facility-administered medications on file prior to visit.        Allergies:   Review of patient's allergies indicates:   Allergen Reactions    Pcn [penicillins]        Social History:   Social History     Socioeconomic History    Marital status:      Spouse name: None    Number of children: None    Years of education: None    Highest education level: None   Social Needs    Financial resource strain: None    Food insecurity - worry: None    Food insecurity - inability: None    Transportation needs - medical: None    Transportation needs - non-medical: None   Occupational History    None   Tobacco Use    Smoking status: Never Smoker   Substance and Sexual Activity    Alcohol use: None    Drug use: None    Sexual activity: None   Other Topics Concern    None   Social History Narrative    None     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    PHYSICAL EXAMINATION:   General    Vitals:    09/18/18 1038   BP: (!) 184/91   Pulse: 71   Weight: 118.2 kg (260 lb 9.3 oz)   Height: 6' 5.5" (1.969 m)     Constitutional: Oriented to person, place, and time. No apparent distress. Appears well-developed and well-nourished. Pleasant.  HENT:   Head: Normocephalic and atraumatic.   Eyes: Right eye exhibits no discharge. Left eye exhibits no discharge. No scleral icterus.   Pulmonary/Chest: Effort normal. No respiratory distress.   Abdominal: There is no guarding.   Neurological: Alert and oriented to person, place, and time.   Psychiatric: Behavior is normal.   Right Elbow Exam     Tenderness   The patient is experiencing tenderness in the lateral epicondyle.     Range of Motion   Extension: 10   Flexion: 140     Muscle Strength   Pronation:  5/5   Supination: "  5/5     Tests   Varus: negative  Valgus: negative  Tinel's sign (cubital tunnel): negative    Other   Sensation: normal    Comments:  Positive Cozens and negative Thinkers on right      Left Elbow Exam     Tenderness   The patient is experiencing tenderness in the lateral epicondyle.     Range of Motion   Extension: 10   Flexion: 140     Muscle Strength   Pronation:  5/5   Supination:  5/5     Tests   Varus: negative  Valgus: negative  Tinel's sign (cubital tunnel): negative    Other   Sensation: normal    Comments:  Positive Cozens and negative Thinkers on left         INSPECTION: There is no swelling, ecchymoses, erythema or gross deformity.    Imaging  X-ray of the right elbow from 10/2/17: normal    MRI of the right elbow from 11/1/17: Minimal signal abnormality at the origin of the common extensor tendon suggestive of tendinosis or minimal partial tear.    Diagnostic ultrasound exam:  Limited diagnostic exam of the bilateral common extensor tendons was performed today.  The images were saved will be uploaded to EMR.  On the right, the proximal portion of the common extensor tendon was observed to be mildly thickened.  There was also a mild to moderate degree of heterogenous echotexture.  There was a small osteophyte at the superficial portion of the lateral epicondyle of the humerus.  There are no significant gaps in the tendon that would suggest significant tearing.  There is a moderate degree of hyperemia/neovascularization seen on color Doppler function within the tendon substance.  The lateral collateral ligament was intact there was no lateral elbow joint effusion.    On the left, there was a mild degree of heterogenous echotexture consistent with tendinopathy.  There was no significant thickening or hypoechoic gaps in the tendon that would suggest significant tearing.  The lateral collateral ligament is intact and was in the lateral elbow joint effusion.  Color Doppler function did not show any increased  hyperemia/neovascularization within the tendon substance.    Data Reviewed: X-ray, MRI, ultrasound    Supportive Actions: Independent visualization of images or test specimens    ASSESSMENT:   Encounter Diagnosis   Name Primary?    Lateral epicondylitis of both elbows Yes     PLAN:     1. Time was spent reviewing the above diagnosis in depth with Tabitha today, including acute management and rehabilitation.     2. Patient with moderate right lateral epicondylitis and mild left epicondylitis by ultrasound findings today in clinic.    3. PT ordered 2-3x/week for 4-6 weeks with emphasis on modalities.     4. Discussed other therapeutic options including Tenex and PRP injections if no improvement with therapy.    5.  He may contact our clinic in the next 4-6 weeks if he is not improved.  At that point we will likely schedule for a Tenex procedure to the right common extensor tendon.    The above note was completed, in part, with the aid of Dragon dictation software/hardware. Translation errors may be present.

## 2018-09-27 ENCOUNTER — CLINICAL SUPPORT (OUTPATIENT)
Dept: REHABILITATION | Facility: HOSPITAL | Age: 43
End: 2018-09-27
Attending: PHYSICAL MEDICINE & REHABILITATION
Payer: COMMERCIAL

## 2018-09-27 DIAGNOSIS — M25.522 PAIN OF BOTH ELBOWS: ICD-10-CM

## 2018-09-27 DIAGNOSIS — M62.89 MUSCLE TIGHTNESS: ICD-10-CM

## 2018-09-27 DIAGNOSIS — M25.521 PAIN OF BOTH ELBOWS: ICD-10-CM

## 2018-09-27 PROCEDURE — 97140 MANUAL THERAPY 1/> REGIONS: CPT | Mod: PN

## 2018-09-27 PROCEDURE — 97161 PT EVAL LOW COMPLEX 20 MIN: CPT | Mod: PN

## 2018-09-27 NOTE — PLAN OF CARE
OUTPATIENT PHYSICAL THERAPY  PHYSICAL THERAPY EVALUATION    Name: Tabitha Fairchild  Clinic Number: [id number]    Evaluation Date: 09/27/2018  Visit #: 1 / 30  Authorization period Expiration: 12/31/2018  Plan of Care Expiration: 12/31/2018  Precautions: Standard     Diagnosis:   Encounter Diagnoses   Name Primary?    Pain of both elbows     Muscle tightness      Physician: Alejo Miller, *  Treatment Orders: PT Eval and Treat  Past Medical History:   Diagnosis Date    Anal fissure     Hemorrhoids     Seasonal allergies 3/18/2016     Current Outpatient Medications   Medication Sig    acetaminophen (TYLENOL EXTRA STRENGTH) 500 MG tablet Take 500 mg by mouth every 6 (six) hours as needed for Pain.    meloxicam (MOBIC) 15 MG tablet Take 1 tablet (15 mg total) by mouth once daily.    multivitamin capsule Take 1 capsule by mouth once daily.    naproxen sodium (ALEVE ORAL) Take by mouth as needed.     No current facility-administered medications for this visit.      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 Pt reports pain began in R elbow approximately 1 year ago, and responded well to cortisone injections. However, pain began in B UE approximately 6 months ago. Pt is right hand dominant, and reports R is worse than L. 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 Pt reports he performs volleys, sets, and serves repetitively throughout  "the day. Pt reports he has been able to complete activities with compression braces, but does not perform at 100% effort. Pt reports symptoms progressively get worse throughout the day with continuation of activity. Pt with difficulty performing work related activity, picking up children, and picking up groceries. Pt additionally reports pain with any activity involving end range flexion and extension.     DOI: 1 Year ago   Imaging, MRI studies: 10/02/2018 "Limited study due to motion artifact.  Minimal signal abnormality at the origin of the common extensor tendon suggestive of tendinosis or minimal partial tear."  Pain: current 0/10, worst 6/10, best 0/10, Aching, intermittent  Radicular symptoms: none   Aggravating factors: keeping elbows flexed or extended in prolonged positions, lifting   Easing factors: stretch, ice, rest   Pts goals: Reduce pain and return to PLOF    Objective   Mental status: alert, interactive, oriented x3  Posture/ Alignment: Good      GAIT DEVIATIONS: Sinisa amb with no obvious abnormality .    ROM:    AROM Right Left Comment Normal   Elbow Flexion: 135 degrees 130 degrees Pain at end range 180 deg   Elbow Extension: 0 degrees 0 degrees Pain at end range 60 deg   Wrist Flexion:: 80 degrees 70 degrees  180 deg   Wrist Extension: 85  degrees 70 degrees  90 deg   *denotes pain    Shoulder AROM within functional limits and without symptom provocation.     Strength:    Right Left Comment   Shoulder flexion: 5/5 5/5    Shoulder Abduction: 5/5 5/5    Elbow Flexion: 5/5 5/5    Elbow Extension: 5/5 5/5     5/5 5/5      Wrist Flexion: 5/5 BUE  Wrist Extension:  5/5 B UE    Special Tests:  Cozen's: positive BUE  Mill's: positive BUE     Neurovascular:  - Sensation: grossly intact to light touch across BUE    Palpation:  Pt with TTP increased muscle tone and soft tissue restriction at ECRB and extensor tendon origin (R>L).     Joint Play:  Normal radioulnar mobility in PA and AP planes  Normal " humeroulnar mobility       Pt/family was provided educational information, including: PT evaluation findings, role of PT, goals for PT, scheduling - pt verbalized understanding. Discussed insurance plan with pt.     TREATMENT   Time In: 10:54 AM  Time Out: 11:50 AM    Discussed Plan of Care with patient: Yes    Tabitha received 10 minutes of manual therapy including:   IASTM to extensor tendon origin of BUE      Tabitha received 5 minutes of therapeutic exercises including:   UBE fwd/retro    Written Home Exercises Provided: yes   Exercises were reviewed and Tabitha was able to demonstrate them prior to the end of the session. Pt received a written copy of exercises to perform at home. Tabitha demonstrated good  understanding of the education provided.     Assessment   Tabitha is a 42 y.o. male referred to outpatient physical therapy with a medical diagnosis of lateral epicondylitis. Pt demonstrates increased soft tissue restriction and abnormality of common extensor tendon origin of BUE (R>L), pain with end range AROM of B elbows, positive provocation testing, and impaired functional use of BUE. Pt is a good candidate for skilled therapy services at this time to restore integrity of common extensor tendon, increase functional use of BUE, and improve body mechanics to avoid overuse; so he may return to PLOF and prevent future injury. Pt prognosis is Fair. Positive prognostic factors include motivation for therapy services, active lifestyle. Negative prognostic factors include chronicity of symptoms. Pt will benefit from skilled outpatient physical therapy to address the above stated deficits, provide pt/family education, and to maximize pt's level of independence.     History  Co-morbidities and personal factors that may impact the plan of care Examination  Body Structures and Functions, activity limitations and participation restrictions that may impact the plan of care    Clinical Presentation   Co-morbidities:    none        Personal Factors:   no deficits Body Regions:   upper extremities    Body Systems:   gross symmetry  ROM  strength        Participation Restrictions:   Pt unable to fully participate in work related activity     Activity limitations:   Learning and applying knowledge  no deficits    General Tasks and Commands  no deficits    Communication  no deficits    Mobility  no deficits    Self care  no deficits    Domestic Life  no deficits    Interactions/Relationships  no deficits    Life Areas  no deficits    Community and Social Life  no deficits         stable and uncomplicated                      low   low  low Decision Making/ Complexity Score:  low     Pt's spiritual, cultural and educational needs considered and pt agreeable to plan of care and goals as stated below:     Anticipated Barriers for physical therapy: none     Short Term GOALS:  In 4 weeks, pt. will:  1. Pt will participate in 50% of work related activity without symptom provocation so he may improve tolerance for activity   2. Pt with demonstrate full AROM of B elbows without pain to increase tolerance for ADLs  3. Pt will be able to lift and carry 5# with RUE to increase tolerance for ADLs  4. Pt will demonstrate compliance and tolerance to HEP    Long Term GOALS:  In 8 weeks, pt. Will:  1. Pt will participate in 75% of work related activity 3 times per week without symptom provocation  2. Pt will complete 1 volleyball game without symptom provocation   3. Pt will demonstrate improved postural awareness to avoid compensatory behaviors in sport  - be independent with HEP and SX management     Plan   Outpatient physical therapy 1 - 2 times weekly to include: pt ed, HEP, therapeutic exercises, therapeutic activities, neuromuscular re-education/ balance exercises, manual therapy, dry needling, and modalities prn. Cont PT for 6 - 8 weeks. Pt may be seen by PTA as part of the rehabilitation team.     I certify the need for these services  furnished under this plan of treatment and while under my care.    Cherri Batista, PT

## 2018-10-04 ENCOUNTER — CLINICAL SUPPORT (OUTPATIENT)
Dept: REHABILITATION | Facility: HOSPITAL | Age: 43
End: 2018-10-04
Attending: PHYSICAL MEDICINE & REHABILITATION
Payer: COMMERCIAL

## 2018-10-04 DIAGNOSIS — M25.521 PAIN OF BOTH ELBOWS: ICD-10-CM

## 2018-10-04 DIAGNOSIS — M62.89 MUSCLE TIGHTNESS: ICD-10-CM

## 2018-10-04 DIAGNOSIS — M25.522 PAIN OF BOTH ELBOWS: ICD-10-CM

## 2018-10-04 PROCEDURE — 97140 MANUAL THERAPY 1/> REGIONS: CPT | Mod: PN

## 2018-10-04 PROCEDURE — 97110 THERAPEUTIC EXERCISES: CPT | Mod: PN

## 2018-10-04 PROCEDURE — 97035 APP MDLTY 1+ULTRASOUND EA 15: CPT | Mod: PN

## 2018-10-04 NOTE — PROGRESS NOTES
"Name: Tabitha [facility] Number: [id number]  Date of Treatment: 10/04/2018   Diagnosis:   Encounter Diagnoses   Name Primary?    Pain of both elbows     Muscle tightness        Physician: Alejo Miller, *    Time in: 10:55 AM  Time Out: 11:40 AM  Total Treatment Time: 45 minutes   Last PN: NA  Date of eval:09/27/2018  Visit #: 2/30  Auth expiration: 12/31/2018  POC expiration: 12/31/2018    Precautions: Standard    Subjective     Tabitha reports he is beginning to feel better. Pt reports he has been performing the ice massage at home, which helps.  Patient reports their pain to be 1/10 on a 0-10 scale with 0 being no pain and 10 being the worst pain imaginable.    Objective     Tabitha received therapeutic exercises to develop strength, endurance and flexibility for 15 minutes including:   Extensor Stretching - around the clock 2 x 30" each position each UE  Flexor Stretch - around the clock 2 x 30" each position each UE  UBE 6" 3/3  Resisted Wrist Flexion GTB 2 x 10 (NP - next visit)  Resisted Wrist Extension GTB 2 x 10 (NP - next visit)    Tabitha received the following manual therapy techniques: Soft tissue Mobilization were applied to the: BUE for 15 minutes.   IASTM to B extensor tendon origins   Flexor/Extensor Septum separation     Ultrasound perform for 5 minutes at each extensor tendon origin of BUE at 3.3 MHz and 1.0 W/cm2. Pt cleared of any contraindications and educated on procedure.     Written Home Exercises Provided: No, pt instructed to continue with previously issued HEP to tolerance    Pt educated on new therex, soreness after therapy. Pt demo good understanding of the education provided. Tabitha demonstrated good return demonstration of activities.     Assessment   Tabitha participated in today's treatment session without symptom provocation or adverse effects. Pt demonstrated significant soft tissue restriction in R extensor tendon compared to LUE. Pt tolerated manual therapy treatment well " without symptom provocation. Pt educated on activity modification, neutral wrists, and supination with lifting activities. Will continue to monitor and progress as tolerated. Pt will continue to benefit from skilled PT intervention. Medical Necessity is demonstrated by:  Continued inability to participate in vocational pursuits, Pain limits function of effected part for some activities and Unable to participate fully in daily activities.    Patient is making good progress towards established goals.    New/Revised goals: none at this time    Short Term GOALS:  In 4 weeks, pt. will:  1. Pt will participate in 50% of work related activity without symptom provocation so he may improve tolerance for activity   2. Pt with demonstrate full AROM of B elbows without pain to increase tolerance for ADLs  3. Pt will be able to lift and carry 5# with RUE to increase tolerance for ADLs  4. Pt will demonstrate compliance and tolerance to HEP     Long Term GOALS:  In 8 weeks, pt. Will:  1. Pt will participate in 75% of work related activity 3 times per week without symptom provocation  2. Pt will complete 1 volleyball game without symptom provocation   3. Pt will demonstrate improved postural awareness to avoid compensatory behaviors in sport  - be independent with HEP and SX management     Plan   Continue with established Plan of Care towards PT goals.       Cherri Batista, PT

## 2018-10-11 ENCOUNTER — CLINICAL SUPPORT (OUTPATIENT)
Dept: REHABILITATION | Facility: HOSPITAL | Age: 43
End: 2018-10-11
Attending: PHYSICAL MEDICINE & REHABILITATION
Payer: COMMERCIAL

## 2018-10-11 DIAGNOSIS — M25.522 PAIN OF BOTH ELBOWS: ICD-10-CM

## 2018-10-11 DIAGNOSIS — M25.521 PAIN OF BOTH ELBOWS: ICD-10-CM

## 2018-10-11 DIAGNOSIS — M62.89 MUSCLE TIGHTNESS: ICD-10-CM

## 2018-10-11 PROCEDURE — 97140 MANUAL THERAPY 1/> REGIONS: CPT | Mod: PN

## 2018-10-11 PROCEDURE — 97110 THERAPEUTIC EXERCISES: CPT | Mod: PN

## 2018-10-11 PROCEDURE — 97035 APP MDLTY 1+ULTRASOUND EA 15: CPT | Mod: PN

## 2018-10-11 NOTE — PROGRESS NOTES
"Name: Tabitha [facility] Number: [id number]  Date of Treatment: 10/11/2018   Diagnosis:   Encounter Diagnoses   Name Primary?    Pain of both elbows     Muscle tightness        Physician: Alejo Miller, *    Time in: 10:00 AM  Time Out: 10:45 AM  Total Treatment Time: 45 minutes   Last PN: NA  Date of eval:09/27/2018  Visit #: 3/30  Auth expiration: 12/31/2018  POC expiration: 12/31/2018    Precautions: Standard    Subjective     Tabitha reports he is feeling somewhat better. Pt reports continued pain with end range flexion. Patient reports their pain to be 1/10 on a 0-10 scale with 0 being no pain and 10 being the worst pain imaginable.    Objective     Tabitha received therapeutic exercises to develop strength, endurance and flexibility for 20 minutes including:   Extensor Stretching - around the clock 2 x 30" each position each UE  Flexor Stretch - around the clock 2 x 30" each position each UE  UBE 6" 3/3 (NP)  Resisted Wrist Flexion 2# 2 x 10  Resisted Wrist Extension 2# 2 x 10    Tabitha received the following manual therapy techniques: Soft tissue Mobilization were applied to the: BUE for 15 minutes.   IASTM to B extensor tendon origins   Flexor/Extensor Septum separation     Ultrasound perform for 5 minutes at each extensor tendon origin of BUE at 3.3 MHz and 1.0 W/cm2. Pt cleared of any contraindications and educated on procedure.     Written Home Exercises Provided: No, pt instructed to continue with previously issued HEP to tolerance    Pt educated on new therex, soreness after therapy. Pt demo good understanding of the education provided. Tabitha demonstrated good return demonstration of activities.     Assessment   Tabitha participated in today's treatment session without symptom provocation or adverse effects. Pt demonstrated improvements in soft tissue quality of R extensor origin compared to previous treatment session. Pt continues to display soft tissue restriction in extensor muscle belly of " JONA. Pt able to tolerate progression of therex to include strengthening of forearm flexor/extensors. Pt required min cueing for eccentric control during strengthening therex. Will continue to monitor and progress as tolerated. Pt will continue to benefit from skilled PT intervention. Medical Necessity is demonstrated by:  Continued inability to participate in vocational pursuits, Pain limits function of effected part for some activities and Unable to participate fully in daily activities.    Patient is making good progress towards established goals.    New/Revised goals: none at this time    Short Term GOALS:  In 4 weeks, pt. will:  1. Pt will participate in 50% of work related activity without symptom provocation so he may improve tolerance for activity   2. Pt with demonstrate full AROM of B elbows without pain to increase tolerance for ADLs  3. Pt will be able to lift and carry 5# with RUE to increase tolerance for ADLs  4. Pt will demonstrate compliance and tolerance to HEP     Long Term GOALS:  In 8 weeks, pt. Will:  1. Pt will participate in 75% of work related activity 3 times per week without symptom provocation  2. Pt will complete 1 volleyball game without symptom provocation   3. Pt will demonstrate improved postural awareness to avoid compensatory behaviors in sport  - be independent with HEP and SX management     Plan   Continue with established Plan of Care towards PT goals.       Cherri Batista, PT

## 2018-10-25 ENCOUNTER — CLINICAL SUPPORT (OUTPATIENT)
Dept: REHABILITATION | Facility: HOSPITAL | Age: 43
End: 2018-10-25
Attending: PHYSICAL MEDICINE & REHABILITATION
Payer: COMMERCIAL

## 2018-10-25 DIAGNOSIS — M25.521 PAIN OF BOTH ELBOWS: ICD-10-CM

## 2018-10-25 DIAGNOSIS — M62.89 MUSCLE TIGHTNESS: ICD-10-CM

## 2018-10-25 DIAGNOSIS — M25.522 PAIN OF BOTH ELBOWS: ICD-10-CM

## 2018-10-25 PROCEDURE — 97035 APP MDLTY 1+ULTRASOUND EA 15: CPT | Mod: PN

## 2018-10-25 PROCEDURE — 97110 THERAPEUTIC EXERCISES: CPT | Mod: PN

## 2018-10-25 PROCEDURE — 97140 MANUAL THERAPY 1/> REGIONS: CPT | Mod: PN

## 2018-10-25 NOTE — PROGRESS NOTES
"Name: Tabitha [facility] Number: [id number]  Date of Treatment: 10/25/2018   Diagnosis:   Encounter Diagnoses   Name Primary?    Pain of both elbows     Muscle tightness        Physician: Alejo Miller, *    Time in: 10:00 AM  Time Out: 10:55 AM  Total Treatment Time: 55 minutes   Last PN: NA  Date of eval:09/27/2018  Visit #: 4/30  Auth expiration: 12/31/2018  POC expiration: 12/31/2018    Precautions: Standard    Subjective     Tabitha reports was a little stiff this past week, since he missed last treatment session. Pt reports he continues to have end range pain with flexion and extension . Patient reports their pain to be 1/10 on a 0-10 scale with 0 being no pain and 10 being the worst pain imaginable. Worst 4/10    Objective     ROM:     AROM Right Left Comment Normal   Elbow Flexion: 135 degrees 133 degrees  180 deg   Elbow Extension: 0 degrees 0 degrees  60 deg   Wrist Flexion:: 80 degrees 80 degrees   180 deg   Wrist Extension: 70  degrees 70 degrees   90 deg   *denotes pain     Shoulder AROM within functional limits and without symptom provocation.      Strength:     Right Left Comment   Shoulder flexion: 5/5 5/5     Shoulder Abduction: 5/5 5/5     Elbow Flexion: 5/5 5/5     Elbow Extension: 5/5 5/5      5/5 5/5        Wrist Flexion: 5/5 BUE  Wrist Extension:  5/5 B UE     Special Tests:  Cozen's: positive BUE  Mill's: positive BUE      Neurovascular:  - Sensation: grossly intact to light touch across BUE     Palpation:  Pt without TTP at extensor origin of BUE. Pt with improved soft tissue quality of extensor tendon and muscle bellies; however, palpable scar tissue remains present.      Joint Play:  Normal radioulnar mobility in PA and AP planes  Normal humeroulnar mobility       Tabitha received therapeutic exercises to develop strength, endurance and flexibility for 30 minutes including:   Extensor Stretching - around the clock 2 x 30" each position each UE  Flexor Stretch - around the clock 2 " "x 30" each position each UE  UBE 6" 3/3 (NP)  Resisted Wrist Flexion 2# 3 x 10  Resisted Wrist Extension 2# 3 x 10  Bicep Curls 5# 2 x 10   Hammer Curls 5# 2 x 10   Countertop Push Ups 2 x 10  Tricep Ext GTB 2 x 10     (next visit: pronation/supination)    Tabitha received the following manual therapy techniques: Soft tissue Mobilization were applied to the: BUE for 15 minutes.   IASTM to B extensor tendon origins   Flexor/Extensor Septum separation     Ultrasound perform for 5 minutes at each extensor tendon origin of BUE at 3.3 MHz and 1.0 W/cm2. (10 min total) Pt cleared of any contraindications and educated on procedure.     Written Home Exercises Provided: Yes, pt provided with handout of bolded exercises performed today to add to current HEP    Pt educated on new therex, soreness after therapy. Pt demo good understanding of the education provided. Tabitha demonstrated good return demonstration of activities.     Assessment   Tabitha participated in today's treatment session without symptom provocation or adverse effects. Pt with significant improvement in soft tissue quality of extensor tendon. Pt with improved AROM of wrist and elbow flexion. Pt able to tolerate progression of therex to include kinetic chain strengthening and CKC exercises to promote weight bearing. Will continue to monitor and progress as tolerated. Pt will continue to benefit from skilled PT intervention. Medical Necessity is demonstrated by:  Continued inability to participate in vocational pursuits, Pain limits function of effected part for some activities and Unable to participate fully in daily activities.    Patient is making good progress towards established goals.    New/Revised goals: none at this time    Short Term GOALS:  In 4 weeks, pt. will:  1. Pt will participate in 50% of work related activity without symptom provocation so he may improve tolerance for activity (MET 10/25/2018)  2. Pt with demonstrate full AROM of B elbows " without pain to increase tolerance for ADLs (MET 10/25/2018)  3. Pt will be able to lift and carry 5# with RUE to increase tolerance for ADLs (MET 10/25/2018)  4. Pt will demonstrate compliance and tolerance to HEP (MET 10/25/2018)     Long Term GOALS:  In 8 weeks, pt. Will:  1. Pt will participate in 75% of work related activity 3 times per week without symptom provocation  2. Pt will complete 1 volleyball game without symptom provocation   3. Pt will demonstrate improved postural awareness to avoid compensatory behaviors in sport  4. Be independent with HEP and SX management     Plan   Continue with established Plan of Care towards PT goals.       Cherri Batista, PT

## 2018-11-08 ENCOUNTER — CLINICAL SUPPORT (OUTPATIENT)
Dept: REHABILITATION | Facility: HOSPITAL | Age: 43
End: 2018-11-08
Attending: PHYSICAL MEDICINE & REHABILITATION
Payer: COMMERCIAL

## 2018-11-08 DIAGNOSIS — M62.89 MUSCLE TIGHTNESS: ICD-10-CM

## 2018-11-08 DIAGNOSIS — M25.521 PAIN OF BOTH ELBOWS: ICD-10-CM

## 2018-11-08 DIAGNOSIS — M25.522 PAIN OF BOTH ELBOWS: ICD-10-CM

## 2018-11-08 PROCEDURE — 97110 THERAPEUTIC EXERCISES: CPT | Mod: PN

## 2018-11-08 PROCEDURE — 97035 APP MDLTY 1+ULTRASOUND EA 15: CPT | Mod: PN

## 2018-11-08 PROCEDURE — 97140 MANUAL THERAPY 1/> REGIONS: CPT | Mod: PN

## 2018-11-08 NOTE — PROGRESS NOTES
"Name: Tabitha [facility] Number: [id number]  Date of Treatment: 11/08/2018   Diagnosis:   No diagnosis found.    Physician: Alejo Miller, *    Time in: 1:08 PM  Time Out: 1:58 PM  Total Treatment Time: 50 minutes   Last PN: NA  Date of eval:09/27/2018  Visit #: 5/30  Auth expiration: 12/31/2018  POC expiration: 12/31/2018    Precautions: Standard    Subjective     Tabitha reports he is feeling better, and participating in more exercise. Pt reports he continues to have pain at end range flexion and extension. Patient reports their pain to be 1/10 on a 0-10 scale with 0 being no pain and 10 being the worst pain imaginable. Worst 4/10    Objective       Tabitha received therapeutic exercises to develop strength, endurance and flexibility for 30 minutes including:   Extensor Stretching - around the clock 2 x 30" each position each UE  Flexor Stretch - around the clock 2 x 30" each position each UE  UBE 6" 3/3 (NP)  Resisted Wrist Flexion 2# 3 x 10  Resisted Wrist Extension 2# 3 x 10  Bicep Curls 5# 2 x 10   Hammer Curls 5# 2 x 10   Table Top Push Ups 2 x 10  Tricep Ext GTB 2 x 10   Resisted Radial deviation 3# 2 x 10   Rows GTB @ 45 deg Abd 3 x 10    (next visit: pronation/supination)    Tabitha received the following manual therapy techniques: Soft tissue Mobilization were applied to the: BUE for 10 minutes.   IASTM to B extensor tendon origins   Flexor/Extensor Septum separation     Ultrasound perform for 5 minutes at each extensor tendon origin of BUE at 3.3 MHz and 1.0 W/cm2. (10 min total) Pt cleared of any contraindications and educated on procedure.     Written Home Exercises Provided: No, pt instructed to continue with previously issued HEP    Pt educated on new therex, soreness after therapy. Pt demo good understanding of the education provided. Tabitha demonstrated good return demonstration of activities.     Assessment   Tabitha participated in today's treatment session without symptom provocation or " adverse effects. Pt with increased soft tissue restriction to R wrist extensor group compared to previous treatment. Pt able to tolerate addition of new therex to include kinetic chain strengthening. Pt may benefit from dry needling next treatment session to improve muscle quality and promote healing. Will continue to monitor and progress as tolerated. Pt will continue to benefit from skilled PT intervention. Medical Necessity is demonstrated by:  Continued inability to participate in vocational pursuits, Pain limits function of effected part for some activities and Unable to participate fully in daily activities.    Patient is making good progress towards established goals.    New/Revised goals: none at this time    Short Term GOALS:  In 4 weeks, pt. will:  1. Pt will participate in 50% of work related activity without symptom provocation so he may improve tolerance for activity (MET 10/25/2018)  2. Pt with demonstrate full AROM of B elbows without pain to increase tolerance for ADLs (MET 10/25/2018)  3. Pt will be able to lift and carry 5# with RUE to increase tolerance for ADLs (MET 10/25/2018)  4. Pt will demonstrate compliance and tolerance to HEP (MET 10/25/2018)     Long Term GOALS:  In 8 weeks, pt. Will:  1. Pt will participate in 75% of work related activity 3 times per week without symptom provocation  2. Pt will complete 1 volleyball game without symptom provocation   3. Pt will demonstrate improved postural awareness to avoid compensatory behaviors in sport  4. Be independent with HEP and SX management     Plan   Continue with established Plan of Care towards PT goals.       Cherri Batista, PT

## 2018-11-28 ENCOUNTER — OFFICE VISIT (OUTPATIENT)
Dept: INTERNAL MEDICINE | Facility: CLINIC | Age: 43
End: 2018-11-28
Payer: COMMERCIAL

## 2018-11-28 VITALS
TEMPERATURE: 98 F | BODY MASS INDEX: 30.31 KG/M2 | SYSTOLIC BLOOD PRESSURE: 124 MMHG | WEIGHT: 262 LBS | HEART RATE: 57 BPM | HEIGHT: 78 IN | DIASTOLIC BLOOD PRESSURE: 82 MMHG

## 2018-11-28 DIAGNOSIS — Z00.00 HEALTH MAINTENANCE EXAMINATION: ICD-10-CM

## 2018-11-28 DIAGNOSIS — R22.1 NECK NODULE: Primary | ICD-10-CM

## 2018-11-28 PROCEDURE — 99214 OFFICE O/P EST MOD 30 MIN: CPT | Mod: S$GLB,,, | Performed by: INTERNAL MEDICINE

## 2018-11-28 PROCEDURE — 99999 PR PBB SHADOW E&M-EST. PATIENT-LVL IV: CPT | Mod: PBBFAC,,, | Performed by: INTERNAL MEDICINE

## 2018-11-28 PROCEDURE — 3008F BODY MASS INDEX DOCD: CPT | Mod: CPTII,S$GLB,, | Performed by: INTERNAL MEDICINE

## 2018-11-28 NOTE — PROGRESS NOTES
"Subjective:       Patient ID: Tabitha Fairchild is a 43 y.o. male.    Chief Complaint: Sore Throat    HPI    Yesterday noted swelling in the right part of his neck.  Local nodule, subcutaneous without overlying skin change.  No significant cough.  Felt somewhat sick yesterday in the morning, however today has been feeling well.  His wife was sick about 1 week ago.  Reports that the area in the neck feels sore when turning stretching or swallowing, but not severe.  No fever or chills over the last 48 hr.    Review of Systems   Musculoskeletal: Positive for myalgias (bilateral tennis elbow).   All other systems reviewed and are negative.      Objective:      Physical Exam   Constitutional: No distress.    man whose Body mass index is 30.28 kg/m².    HENT:   Right Ear: Tympanic membrane normal. No tenderness.   Left Ear: Tympanic membrane normal. No tenderness.   Mouth/Throat: Oropharynx is clear and moist. No oropharyngeal exudate.   Neck: Normal range of motion. No thyromegaly present.       Pulmonary/Chest: Effort normal and breath sounds normal. No stridor. He has no wheezes. He has no rales.   Lymphadenopathy:     He has cervical adenopathy (R lateral neck).   Nursing note and vitals reviewed.      Vitals:    11/28/18 1505   BP: 124/82   BP Location: Right arm   Patient Position: Sitting   BP Method: Large (Manual)   Pulse: (!) 57   Temp: 98.3 °F (36.8 °C)   TempSrc: Oral   Weight: 118.8 kg (262 lb)   Height: 6' 6" (1.981 m)     Body mass index is 30.28 kg/m².    RESULTS: Reviewed labs from last 12 months    Assessment:       1. Neck nodule    2. Health maintenance examination        Plan:   Tabitha was seen today for sore throat.    Diagnoses and all orders for this visit:    Neck nodule:  New episode of recurrent problem.  Likely lymphadenopathy related to upper respiratory illness.  Given recurrence, patient may need ultrasound if symptoms do not resolve.  Ultrasound orders have been placed, if symptoms " persist or worsen call scheduling to get the appointment.  -     TSH; Future  -     US Soft Tissue Head Neck Thyroid; Future    Health maintenance examination  -     Lipid panel; Future  -     Comprehensive metabolic panel; Future  -     CBC Without Differential; Future    Follow-up in about 1 year (around 11/28/2019) for EPP annual exam.  Geovani Li MD  Internal Medicine    Portions of this note were completed using medical dictation software. Please excuse typographical or syntax errors that were missed on review.

## 2018-11-28 NOTE — PATIENT INSTRUCTIONS
I will place orders for the ultrasound. If after a few days there's persistent swelling or any worsening of the neck nodule, please call to schedule the tests. If things have resolved then we can continue to monitor and watch for changes.

## 2018-12-06 ENCOUNTER — CLINICAL SUPPORT (OUTPATIENT)
Dept: REHABILITATION | Facility: HOSPITAL | Age: 43
End: 2018-12-06
Attending: PHYSICAL MEDICINE & REHABILITATION
Payer: COMMERCIAL

## 2018-12-06 DIAGNOSIS — M25.521 PAIN OF BOTH ELBOWS: ICD-10-CM

## 2018-12-06 DIAGNOSIS — M62.89 MUSCLE TIGHTNESS: ICD-10-CM

## 2018-12-06 DIAGNOSIS — M25.522 PAIN OF BOTH ELBOWS: ICD-10-CM

## 2018-12-06 PROCEDURE — 97110 THERAPEUTIC EXERCISES: CPT | Mod: PN

## 2018-12-06 PROCEDURE — 97140 MANUAL THERAPY 1/> REGIONS: CPT | Mod: PN

## 2018-12-06 PROCEDURE — 97035 APP MDLTY 1+ULTRASOUND EA 15: CPT | Mod: PN

## 2018-12-06 NOTE — PROGRESS NOTES
"Name: Tabitha [facility] Number: [id number]  Date of Treatment: 12/06/2018   Diagnosis:   Encounter Diagnoses   Name Primary?    Pain of both elbows     Muscle tightness        Physician: Alejo Miller, *    Time in: 10:00 AM  Time Out: 10:58 AM  Total Treatment Time: 58 minutes   Last PN: NA  Date of eval:09/27/2018  Visit #: 6/30  Auth expiration: 12/31/2018  POC expiration: 12/31/2018    Precautions: Standard    Subjective     Tabitha reports he is feeling much better, but continues to have pain with full extension and full flexion. Patient reports their pain to be 1/10 on a 0-10 scale with 0 being no pain and 10 being the worst pain imaginable. Worst 4/10    Objective     Tabitha received therapeutic exercises to develop strength, endurance and flexibility for 30 minutes including:   Extensor Stretching - around the clock 2 x 30" each position each UE  Flexor Stretch - around the clock 2 x 30" each position each UE  UBE 6" 3/3 (NP)  Resisted Wrist Flexion 2# 3 x 10  Resisted Wrist Extension 2# 3 x 10  Bicep Curls 5# 3 x 10   Hammer Curls 5# 3 x 10   Table Top Push Ups 2 x 10  Tricep Ext GTB 2 x 10   Resisted Radial deviation 3# 2 x 10   Rows GTB @ 45 deg Abd 3 x 10      Tabitha received the following manual therapy techniques: Soft tissue Mobilization were applied to the: BUE for 15 minutes.   IASTM to B extensor tendon origins   Flexor/Extensor Septum separation     Ultrasound perform for 5 minutes at each extensor tendon origin of BUE at 3.3 MHz and 1.0 W/cm2. (10 min total) Pt cleared of any contraindications and educated on procedure.     Written Home Exercises Provided: No, pt instructed to continue with previously issued HEP    Pt educated on new therex, soreness after therapy. Pt demo good understanding of the education provided. Tabitha demonstrated good return demonstration of activities.     Assessment   Tabitha participated in today's treatment session without symptom provocation or adverse " effects. Pt with noted increase in soft tissue restriction of R extensor tendon group compared to L. May be due to prolonged time since previous PT visit. Pt able to tolerate exericse without provocation, and with min cueing only for instruction. Pt may benefit from dry needling treatment in future treatment session. Will continue to monitor and progress as tolerated. Pt will continue to benefit from skilled PT intervention. Medical Necessity is demonstrated by:  Continued inability to participate in vocational pursuits, Pain limits function of effected part for some activities and Unable to participate fully in daily activities.    Patient is making good progress towards established goals.    New/Revised goals: none at this time    Short Term GOALS:  In 4 weeks, pt. will:  1. Pt will participate in 50% of work related activity without symptom provocation so he may improve tolerance for activity (MET 10/25/2018)  2. Pt with demonstrate full AROM of B elbows without pain to increase tolerance for ADLs (MET 10/25/2018)  3. Pt will be able to lift and carry 5# with RUE to increase tolerance for ADLs (MET 10/25/2018)  4. Pt will demonstrate compliance and tolerance to HEP (MET 10/25/2018)     Long Term GOALS:  In 8 weeks, pt. Will:  1. Pt will participate in 75% of work related activity 3 times per week without symptom provocation  2. Pt will complete 1 volleyball game without symptom provocation   3. Pt will demonstrate improved postural awareness to avoid compensatory behaviors in sport  4. Be independent with HEP and SX management     Plan   Continue with established Plan of Care towards PT goals.       Cherri Batista, PT

## 2018-12-20 ENCOUNTER — CLINICAL SUPPORT (OUTPATIENT)
Dept: REHABILITATION | Facility: HOSPITAL | Age: 43
End: 2018-12-20
Attending: PHYSICAL MEDICINE & REHABILITATION
Payer: COMMERCIAL

## 2018-12-20 DIAGNOSIS — M62.89 MUSCLE TIGHTNESS: ICD-10-CM

## 2018-12-20 DIAGNOSIS — M25.521 PAIN OF BOTH ELBOWS: ICD-10-CM

## 2018-12-20 DIAGNOSIS — M25.522 PAIN OF BOTH ELBOWS: ICD-10-CM

## 2018-12-20 PROCEDURE — 97140 MANUAL THERAPY 1/> REGIONS: CPT | Mod: PN

## 2018-12-20 PROCEDURE — 97035 APP MDLTY 1+ULTRASOUND EA 15: CPT | Mod: PN

## 2018-12-20 PROCEDURE — 97110 THERAPEUTIC EXERCISES: CPT | Mod: PN

## 2018-12-20 NOTE — PROGRESS NOTES
"Name: Tabitha Fairchild  [facility] Number: [id number]  Date of Treatment: 12/20/2018   Diagnosis:   Encounter Diagnoses   Name Primary?    Pain of both elbows     Muscle tightness        Physician: Alejo Miller, *    Time in: 11:00 AM  Time Out: 11:58 AM  Total Treatment Time: 58 minutes   Last PN: NA  Date of eval:09/27/2018  Visit #: 7/30  Auth expiration: 12/31/2018  POC expiration: 12/31/2018    Precautions: Standard    Subjective     Tabitha reports he is feeling much better. 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  Patient reports their pain to be 1/10 on a 0-10 scale with 0 being no pain and 10 being the worst pain imaginable. Worst 4/10    Objective     ROM:     AROM Right Left Comment Normal   Elbow Flexion: 135 degrees 133 degrees   180 deg   Elbow Extension: 0 degrees 0 degrees   60 deg   Wrist Flexion:: 80 degrees 80 degrees   180 deg   Wrist Extension: 70  degrees 70 degrees   90 deg   *denotes pain     Shoulder AROM within functional limits and without symptom provocation.      Strength:     Right Left Comment   Shoulder flexion: 5/5 5/5     Shoulder Abduction: 5/5 5/5     Elbow Flexion: 5/5 5/5     Elbow Extension: 5/5 5/5      5/5 5/5        Wrist Flexion: 5/5 BUE  Wrist Extension:  5/5 B UE     Special Tests:  Cozen's: positive BUE  Mill's: positive BUE      Neurovascular:  - Sensation: grossly intact to light touch across BUE     Palpation:  Pt without TTP at extensor origin of BUE. Pt with improved soft tissue quality of extensor tendon and muscle bellies; however, palpable scar tissue remains present.      Joint Play:  Normal radioulnar mobility in PA and AP planes  Normal humeroulnar mobility       Tabitha received therapeutic exercises to develop strength, endurance and flexibility for 30 minutes including:   Extensor Stretching - around the clock 2 x 30" each position each UE  Flexor Stretch - around the clock 2 x 30" each position each UE  UBE 6" " 3/3 (NP)  Resisted Wrist Flexion 2# 3 x 10  Resisted Wrist Extension 2# 3 x 10  Bicep Curls 5# 3 x 10   Hammer Curls 5# 3 x 10   Table Top Push Ups 2 x 10  Tricep Ext GTB 2 x 10   Resisted Radial deviation 3# 2 x 10   Rows GTB @ 45 deg Abd 3 x 10      Tabitha received the following manual therapy techniques: Soft tissue Mobilization were applied to the: BUE for 15 minutes.   IASTM to B extensor tendon origins   Flexor/Extensor Septum separation     Ultrasound perform for 5 minutes at each extensor tendon origin of BUE at 3.3 MHz and 1.0 W/cm2. (10 min total) Pt cleared of any contraindications and educated on procedure.     Written Home Exercises Provided: No, pt instructed to continue with previously issued HEP    Pt educated on new therex, soreness after therapy. Pt demo good understanding of the education provided. Tabitha demonstrated good return demonstration of activities.     Assessment   Tabitha participated in today's treatment session without symptom provocation or adverse effects. Pt demonstrated significant improvements in soft tissue mobility of R wrist extensor muscle bulk. Pt with minimal to no soft tissue restriction in L forearm. Pt appropriate for discharge at this time for independent management of symptoms. Pt may benefit from continued PT during periods of flare ups following prolonged volleyball and clinic practice. Pt instructed on compliance with HEP and management of symptoms. Patient was seen for 7 outpatient PT visits from 09/27/2018 to 12/20/2018. Treatment included: evaluation, HEP, pt education, manual therapy, ther ex, and modaliteis. Pt has met most goals. Continue HEP. This patient is discharged from outpatient PT Services.    Short Term GOALS:  In 4 weeks, pt. will:  1. Pt will participate in 50% of work related activity without symptom provocation so he may improve tolerance for activity (MET 10/25/2018)  2. Pt with demonstrate full AROM of B elbows without pain to increase tolerance  for ADLs (MET 10/25/2018)  3. Pt will be able to lift and carry 5# with RUE to increase tolerance for ADLs (MET 10/25/2018)  4. Pt will demonstrate compliance and tolerance to HEP (MET 10/25/2018)     Long Term GOALS:  In 8 weeks, pt. Will:  1. Pt will participate in 75% of work related activity 3 times per week without symptom provocation (MET 12/20/2018)  2. Pt will complete 1 volleyball game without symptom provocation  (NOT MET)  3. Pt will demonstrate improved postural awareness to avoid compensatory behaviors in sport  (MET 12/20/2018)  4. Be independent with HEP and SX management   (MET 12/20/2018)     Plan   Pt is discharged from skilled therapy services.       Cherri Batista, PT

## 2019-01-22 ENCOUNTER — LAB VISIT (OUTPATIENT)
Dept: LAB | Facility: HOSPITAL | Age: 44
End: 2019-01-22
Attending: INTERNAL MEDICINE
Payer: COMMERCIAL

## 2019-01-22 DIAGNOSIS — R22.1 NECK NODULE: ICD-10-CM

## 2019-01-22 DIAGNOSIS — Z00.00 HEALTH MAINTENANCE EXAMINATION: ICD-10-CM

## 2019-01-22 LAB
ALBUMIN SERPL BCP-MCNC: 4.2 G/DL
ALP SERPL-CCNC: 74 U/L
ALT SERPL W/O P-5'-P-CCNC: 16 U/L
ANION GAP SERPL CALC-SCNC: 11 MMOL/L
AST SERPL-CCNC: 20 U/L
BILIRUB SERPL-MCNC: 1.1 MG/DL
BUN SERPL-MCNC: 9 MG/DL
CALCIUM SERPL-MCNC: 9.4 MG/DL
CHLORIDE SERPL-SCNC: 107 MMOL/L
CHOLEST SERPL-MCNC: 196 MG/DL
CHOLEST/HDLC SERPL: 5.4 {RATIO}
CO2 SERPL-SCNC: 25 MMOL/L
CREAT SERPL-MCNC: 0.9 MG/DL
ERYTHROCYTE [DISTWIDTH] IN BLOOD BY AUTOMATED COUNT: 13.2 %
EST. GFR  (AFRICAN AMERICAN): >60 ML/MIN/1.73 M^2
EST. GFR  (NON AFRICAN AMERICAN): >60 ML/MIN/1.73 M^2
GLUCOSE SERPL-MCNC: 86 MG/DL
HCT VFR BLD AUTO: 45.5 %
HDLC SERPL-MCNC: 36 MG/DL
HDLC SERPL: 18.4 %
HGB BLD-MCNC: 14.6 G/DL
LDLC SERPL CALC-MCNC: 109 MG/DL
MCH RBC QN AUTO: 27.5 PG
MCHC RBC AUTO-ENTMCNC: 32.1 G/DL
MCV RBC AUTO: 86 FL
NONHDLC SERPL-MCNC: 160 MG/DL
PLATELET # BLD AUTO: 207 K/UL
PMV BLD AUTO: 10.5 FL
POTASSIUM SERPL-SCNC: 3.8 MMOL/L
PROT SERPL-MCNC: 7.5 G/DL
RBC # BLD AUTO: 5.3 M/UL
SODIUM SERPL-SCNC: 143 MMOL/L
TRIGL SERPL-MCNC: 255 MG/DL
TSH SERPL DL<=0.005 MIU/L-ACNC: 1.96 UIU/ML
WBC # BLD AUTO: 6.1 K/UL

## 2019-01-22 PROCEDURE — 80053 COMPREHEN METABOLIC PANEL: CPT

## 2019-01-22 PROCEDURE — 84443 ASSAY THYROID STIM HORMONE: CPT

## 2019-01-22 PROCEDURE — 85027 COMPLETE CBC AUTOMATED: CPT

## 2019-01-22 PROCEDURE — 36415 COLL VENOUS BLD VENIPUNCTURE: CPT | Mod: PO

## 2019-01-22 PROCEDURE — 80061 LIPID PANEL: CPT

## 2019-01-25 ENCOUNTER — PATIENT MESSAGE (OUTPATIENT)
Dept: INTERNAL MEDICINE | Facility: CLINIC | Age: 44
End: 2019-01-25

## 2019-01-28 PROBLEM — E78.1 HYPERTRIGLYCERIDEMIA: Status: ACTIVE | Noted: 2019-01-28

## 2019-02-06 ENCOUNTER — OFFICE VISIT (OUTPATIENT)
Dept: URGENT CARE | Facility: CLINIC | Age: 44
End: 2019-02-06
Payer: COMMERCIAL

## 2019-02-06 VITALS
BODY MASS INDEX: 30.31 KG/M2 | OXYGEN SATURATION: 97 % | HEART RATE: 91 BPM | TEMPERATURE: 102 F | DIASTOLIC BLOOD PRESSURE: 86 MMHG | HEIGHT: 78 IN | SYSTOLIC BLOOD PRESSURE: 124 MMHG | RESPIRATION RATE: 16 BRPM | WEIGHT: 262 LBS

## 2019-02-06 DIAGNOSIS — R68.89 FLU-LIKE SYMPTOMS: Primary | ICD-10-CM

## 2019-02-06 DIAGNOSIS — J10.1 INFLUENZA A: ICD-10-CM

## 2019-02-06 LAB
CTP QC/QA: YES
FLUAV AG NPH QL: POSITIVE
FLUBV AG NPH QL: NEGATIVE

## 2019-02-06 PROCEDURE — 87804 POCT INFLUENZA A/B: ICD-10-PCS | Mod: 59,QW,S$GLB, | Performed by: PHYSICIAN ASSISTANT

## 2019-02-06 PROCEDURE — 87804 INFLUENZA ASSAY W/OPTIC: CPT | Mod: QW,S$GLB,, | Performed by: PHYSICIAN ASSISTANT

## 2019-02-06 PROCEDURE — 99214 OFFICE O/P EST MOD 30 MIN: CPT | Mod: S$GLB,,, | Performed by: PHYSICIAN ASSISTANT

## 2019-02-06 PROCEDURE — 3008F BODY MASS INDEX DOCD: CPT | Mod: CPTII,S$GLB,, | Performed by: PHYSICIAN ASSISTANT

## 2019-02-06 PROCEDURE — 99214 PR OFFICE/OUTPT VISIT, EST, LEVL IV, 30-39 MIN: ICD-10-PCS | Mod: S$GLB,,, | Performed by: PHYSICIAN ASSISTANT

## 2019-02-06 PROCEDURE — 3008F PR BODY MASS INDEX (BMI) DOCUMENTED: ICD-10-PCS | Mod: CPTII,S$GLB,, | Performed by: PHYSICIAN ASSISTANT

## 2019-02-06 RX ORDER — ONDANSETRON 4 MG/1
4 TABLET, FILM COATED ORAL EVERY 8 HOURS PRN
Qty: 30 TABLET | Refills: 0 | Status: SHIPPED | OUTPATIENT
Start: 2019-02-06

## 2019-02-06 RX ORDER — FLUTICASONE PROPIONATE 50 MCG
2 SPRAY, SUSPENSION (ML) NASAL DAILY
Qty: 1 BOTTLE | Refills: 0 | Status: SHIPPED | OUTPATIENT
Start: 2019-02-06

## 2019-02-06 RX ORDER — OSELTAMIVIR PHOSPHATE 75 MG/1
75 CAPSULE ORAL 2 TIMES DAILY
Qty: 10 CAPSULE | Refills: 0 | Status: SHIPPED | OUTPATIENT
Start: 2019-02-06 | End: 2019-02-11

## 2019-02-06 NOTE — PROGRESS NOTES
"Subjective:       Patient ID: Tabitha Fairchild is a 43 y.o. male.    Vitals:  height is 6' 6" (1.981 m) and weight is 118.8 kg (262 lb). His oral temperature is 101.9 °F (38.8 °C) (abnormal). His blood pressure is 124/86 and his pulse is 91. His respiration is 16 and oxygen saturation is 97%.     Chief Complaint: Influenza    Last dose of tylenol was at 9:30 am  Zofran at 7:30 am      Influenza   This is a new problem. The current episode started yesterday. The problem occurs daily. The problem has been rapidly worsening. Associated symptoms include chills, congestion, a fever (102 over night), headaches, myalgias and nausea. Pertinent negatives include no coughing, diaphoresis, fatigue, rash, sore throat or vomiting. He has tried acetaminophen for the symptoms. The treatment provided mild relief.       Constitution: Positive for activity change, chills, fever (102 over night) and generalized weakness. Negative for sweating and fatigue.   HENT: Positive for congestion, sinus pain and sinus pressure. Negative for ear pain, sore throat and voice change.    Neck: Negative for painful lymph nodes.   Eyes: Negative for eye redness.   Respiratory: Negative for chest tightness, cough, sputum production, bloody sputum, COPD, shortness of breath, stridor, wheezing and asthma.    Gastrointestinal: Positive for nausea. Negative for vomiting.   Musculoskeletal: Positive for muscle ache.   Skin: Negative for rash.   Allergic/Immunologic: Negative for seasonal allergies and asthma.   Neurological: Positive for headaches.   Hematologic/Lymphatic: Negative for swollen lymph nodes.       Objective:      Physical Exam   Constitutional: He is oriented to person, place, and time. He appears well-developed and well-nourished. He is cooperative.  Non-toxic appearance. He does not appear ill. No distress.   HENT:   Head: Normocephalic and atraumatic.   Right Ear: Hearing, tympanic membrane, external ear and ear canal normal.   Left Ear: " Hearing, tympanic membrane, external ear and ear canal normal.   Nose: Nose normal. No mucosal edema, rhinorrhea or nasal deformity. No epistaxis. Right sinus exhibits no maxillary sinus tenderness and no frontal sinus tenderness. Left sinus exhibits no maxillary sinus tenderness and no frontal sinus tenderness.   Mouth/Throat: Uvula is midline, oropharynx is clear and moist and mucous membranes are normal. No trismus in the jaw. Normal dentition. No uvula swelling. No posterior oropharyngeal erythema.   Eyes: Lids are normal. Right conjunctiva is injected. Left conjunctiva is injected. No scleral icterus.   Neck: Trachea normal, full passive range of motion without pain and phonation normal. Neck supple.   Cardiovascular: Normal rate, regular rhythm, normal heart sounds, intact distal pulses and normal pulses.   Pulmonary/Chest: Effort normal and breath sounds normal. No respiratory distress.   Abdominal: Soft. Normal appearance and bowel sounds are normal. He exhibits no distension. There is no tenderness.   Musculoskeletal: Normal range of motion. He exhibits no edema or deformity.   Neurological: He is alert and oriented to person, place, and time. He exhibits normal muscle tone. Coordination normal.   Skin: Skin is warm, dry and intact. He is not diaphoretic. No pallor.   Psychiatric: He has a normal mood and affect. His speech is normal and behavior is normal. Judgment and thought content normal. Cognition and memory are normal.   Nursing note and vitals reviewed.      Assessment:       1. Flu-like symptoms    2. Influenza A        Plan:         Flu-like symptoms  -     POCT Influenza A/B (positive)    Influenza A    Other orders  -     oseltamivir (TAMIFLU) 75 MG capsule; Take 1 capsule (75 mg total) by mouth 2 (two) times daily. for 5 days  Dispense: 10 capsule; Refill: 0  -     fluticasone (FLONASE ALLERGY RELIEF) 50 mcg/actuation nasal spray; 2 sprays (100 mcg total) by Each Nare route once daily.   Dispense: 1 Bottle; Refill: 0  -     ondansetron (ZOFRAN) 4 MG tablet; Take 1 tablet (4 mg total) by mouth every 8 (eight) hours as needed.  Dispense: 30 tablet; Refill: 0    Symptomatic treatment:    Alternate Tylenol and Ibuprofen every 3 hrs  salt water gargles to soothe throat  Honey/lemon water to soothe throat  Cold-eeze helps to reduce the duration of URI symptoms  Elderberry to reduce duration of URI symptoms  Cepachol helps to numb the discomfort in throat  Nasal saline spray reduces inflammation and dryness  Warm face compresses/hot showers as often as you can to open sinuses   Vicks vapor rub at night  Flonase OTC or Nasacort OTC  Simple foods like chicken noodle soup help hydrate  Delsym helps with coughing at night  Zyrtec/Claritin during the day and Benadryl at night may help if allergy component   Zantac will help if there is reflux from the post nasal drip  Rest as much as you can  Your symptoms will likely last 7-10 days, maybe longer depending on how it affects your body.  You are contagious 7-10, so minimize contact with others to reduce the spread to others and stay home from work or school as we discussed. Dehydration is preventable but is one of the main reasons why you will feel so badly. Drink pedialyte, gatorade or propel. Stay hydrated.  Antibiotics are not needed unless a complication(such as Otitis Media, Bacterial sinus infection or pneumonia). Taking antibiotics for Flu/Cold is not supported by evidencebased medicine and can expose you to unnecessary side effects of the medication, such as anaphylaxis.   If you experience any:  Chest pain, shortness of breath, wheezing or difficulty breathing  Severe headache, face, neck or ear pain  New rash  Fever over 101.5º F (38.6 C) for more than three days  Confusion, behavior change or seizure  Severe weakness or dizziness  Go to ER

## 2019-02-20 ENCOUNTER — OFFICE VISIT (OUTPATIENT)
Dept: URGENT CARE | Facility: CLINIC | Age: 44
End: 2019-02-20
Payer: COMMERCIAL

## 2019-02-20 VITALS
WEIGHT: 262 LBS | SYSTOLIC BLOOD PRESSURE: 137 MMHG | OXYGEN SATURATION: 99 % | BODY MASS INDEX: 30.31 KG/M2 | TEMPERATURE: 98 F | DIASTOLIC BLOOD PRESSURE: 87 MMHG | HEIGHT: 78 IN | RESPIRATION RATE: 16 BRPM | HEART RATE: 74 BPM

## 2019-02-20 DIAGNOSIS — J02.9 SORE THROAT: ICD-10-CM

## 2019-02-20 DIAGNOSIS — J02.0 STREP PHARYNGITIS: Primary | ICD-10-CM

## 2019-02-20 LAB
CTP QC/QA: YES
S PYO RRNA THROAT QL PROBE: POSITIVE

## 2019-02-20 PROCEDURE — 3008F PR BODY MASS INDEX (BMI) DOCUMENTED: ICD-10-PCS | Mod: CPTII,S$GLB,, | Performed by: PHYSICIAN ASSISTANT

## 2019-02-20 PROCEDURE — 87880 POCT RAPID STREP A: ICD-10-PCS | Mod: QW,S$GLB,, | Performed by: PHYSICIAN ASSISTANT

## 2019-02-20 PROCEDURE — 3008F BODY MASS INDEX DOCD: CPT | Mod: CPTII,S$GLB,, | Performed by: PHYSICIAN ASSISTANT

## 2019-02-20 PROCEDURE — 87880 STREP A ASSAY W/OPTIC: CPT | Mod: QW,S$GLB,, | Performed by: PHYSICIAN ASSISTANT

## 2019-02-20 PROCEDURE — 99214 OFFICE O/P EST MOD 30 MIN: CPT | Mod: 25,S$GLB,, | Performed by: PHYSICIAN ASSISTANT

## 2019-02-20 PROCEDURE — 99214 PR OFFICE/OUTPT VISIT, EST, LEVL IV, 30-39 MIN: ICD-10-PCS | Mod: 25,S$GLB,, | Performed by: PHYSICIAN ASSISTANT

## 2019-02-20 PROCEDURE — 96372 THER/PROPH/DIAG INJ SC/IM: CPT | Mod: S$GLB,,, | Performed by: PHYSICIAN ASSISTANT

## 2019-02-20 PROCEDURE — 96372 PR INJECTION,THERAP/PROPH/DIAG2ST, IM OR SUBCUT: ICD-10-PCS | Mod: S$GLB,,, | Performed by: PHYSICIAN ASSISTANT

## 2019-02-20 RX ORDER — BETAMETHASONE SODIUM PHOSPHATE AND BETAMETHASONE ACETATE 3; 3 MG/ML; MG/ML
9 INJECTION, SUSPENSION INTRA-ARTICULAR; INTRALESIONAL; INTRAMUSCULAR; SOFT TISSUE
Status: COMPLETED | OUTPATIENT
Start: 2019-02-20 | End: 2019-02-20

## 2019-02-20 RX ORDER — AZITHROMYCIN 250 MG/1
TABLET, FILM COATED ORAL
Qty: 6 TABLET | Refills: 0 | Status: SHIPPED | OUTPATIENT
Start: 2019-02-20

## 2019-02-20 RX ADMIN — BETAMETHASONE SODIUM PHOSPHATE AND BETAMETHASONE ACETATE 9 MG: 3; 3 INJECTION, SUSPENSION INTRA-ARTICULAR; INTRALESIONAL; INTRAMUSCULAR; SOFT TISSUE at 11:02

## 2019-02-20 NOTE — PROGRESS NOTES
"Subjective:       Patient ID: Tabitha Fairchild is a 43 y.o. male.    Vitals:  height is 6' 6" (1.981 m) and weight is 118.8 kg (262 lb). His oral temperature is 98.1 °F (36.7 °C). His blood pressure is 137/87 and his pulse is 74. His respiration is 16 and oxygen saturation is 99%.     Chief Complaint: Sore Throat    Patient is having a sore throat for 1 day. Patient has taken tylenol.      Sore Throat    This is a new problem. The current episode started yesterday. The problem has been unchanged. Associated symptoms include trouble swallowing. Pertinent negatives include no congestion, coughing, ear pain, shortness of breath, stridor or vomiting. He has tried acetaminophen for the symptoms. The treatment provided mild relief.       Constitution: Negative for chills, sweating, fatigue and fever.   HENT: Positive for sore throat and trouble swallowing. Negative for ear pain, congestion, sinus pain, sinus pressure and voice change.    Neck: Negative for painful lymph nodes.   Eyes: Negative for eye redness.   Respiratory: Negative for chest tightness, cough, sputum production, bloody sputum, COPD, shortness of breath, stridor, wheezing and asthma.    Gastrointestinal: Negative for nausea and vomiting.   Musculoskeletal: Negative for muscle ache.   Skin: Negative for rash.   Allergic/Immunologic: Negative for seasonal allergies and asthma.   Hematologic/Lymphatic: Negative for swollen lymph nodes.       Objective:      Physical Exam   Constitutional: He is oriented to person, place, and time. He appears well-developed and well-nourished. He is cooperative.  Non-toxic appearance. He does not appear ill. No distress.   HENT:   Head: Normocephalic and atraumatic.   Right Ear: Hearing, tympanic membrane, external ear and ear canal normal.   Left Ear: Hearing, tympanic membrane, external ear and ear canal normal.   Nose: Nose normal. No mucosal edema, rhinorrhea or nasal deformity. No epistaxis. Right sinus exhibits no maxillary " sinus tenderness and no frontal sinus tenderness. Left sinus exhibits no maxillary sinus tenderness and no frontal sinus tenderness.   Mouth/Throat: Uvula is midline and mucous membranes are normal. No trismus in the jaw. Normal dentition. No uvula swelling. Posterior oropharyngeal erythema present. No tonsillar exudate.   Eyes: Conjunctivae and lids are normal. No scleral icterus.   Sclera clear bilat   Neck: Trachea normal, full passive range of motion without pain and phonation normal. Neck supple.   Cardiovascular: Normal rate, regular rhythm, normal heart sounds, intact distal pulses and normal pulses.   Pulmonary/Chest: Effort normal and breath sounds normal. No respiratory distress.   Abdominal: Soft. Normal appearance and bowel sounds are normal. He exhibits no distension. There is no tenderness.   Musculoskeletal: Normal range of motion. He exhibits no edema or deformity.   Neurological: He is alert and oriented to person, place, and time. He exhibits normal muscle tone. Coordination normal.   Skin: Skin is warm, dry and intact. He is not diaphoretic. No pallor.   Psychiatric: He has a normal mood and affect. His speech is normal and behavior is normal. Judgment and thought content normal. Cognition and memory are normal.   Nursing note and vitals reviewed.      Assessment:       1. Strep pharyngitis    2. Sore throat        Plan:         Strep pharyngitis    Sore throat  -     POCT rapid strep A (positive)    Other orders  -     betamethasone acetate-betamethasone sodium phosphate injection 9 mg  -     azithromycin (Z-OMAR) 250 MG tablet; Take 2 tablets by mouth on day 1; Take 1 tablet by mouth on days 2-5  Dispense: 6 tablet; Refill: 0    Discussed risks versus benefits of steroid injection and patient would like to proceed.  Patient will call after finishing Z-Omar if he is still symptomatic.  Alternate antibiotic will be called in at that point.      Pharyngitis: Strep (Confirmed)    You have had a  positive test for strep throat. Strep throat is a contagious illness. It is spread by coughing, kissing or by touching others after touching your mouth or nose. Symptoms include throat pain that is worse with swallowing, aching all over, headache, and fever. It is treated with antibiotic medicine. This should help you start to feel better in 1 to 2 days.  Home care  · Rest at home. Drink plenty of fluids to you won't get dehydrated.  · No work or school for the first 2 days of taking the antibiotics. After this time, you will not be contagious. You can then return to school or work if you are feeling better.   · Take antibiotic medicine for the full 10 days, even if you feel better. This is very important to ensure the infection is treated. It is also important to prevent medicine-resistant germs from developing. If you were given an antibiotic shot, you don't need any more antibiotics.  · You may use acetaminophen or ibuprofen to control pain or fever, unless another medicine was prescribed for this. Talk with your doctor before taking these medicines if you have chronic liver or kidney disease. Also talk with your doctor if you have had a stomach ulcer or GI bleeding.  · Throat lozenges or sprays help reduce pain. Gargling with warm saltwater will also reduce throat pain. Dissolve 1/2 teaspoon of salt in 1 glass of warm water. This may be useful just before meals.   · Soft foods are OK. Avoid salty or spicy foods.  Follow-up care  Follow up with your healthcare provider or our staff if you don't get better over the next week.  When to seek medical advice  Call your healthcare provider right away if any of these occur:  · Fever of 100.4ºF (38ºC) or higher, or as directed by your healthcare provider  · New or worsening ear pain, sinus pain, or headache  · Painful lumps in the back of neck  · Stiff neck  · Lymph nodes getting larger or becoming soft in the middle  · You can't swallow liquids or you can't open your  mouth wide because of throat pain  · Signs of dehydration. These include very dark urine or no urine, sunken eyes, and dizziness.  · Trouble breathing or noisy breathing  · Muffled voice  · Rash  Date Last Reviewed: 4/13/2015  © 7461-5806 [employer]. [address] [place] [address]. All rights reserved. This information is not intended as a substitute for professional medical care. Always follow your healthcare professional's instructions.    You must understand that you've received an Urgent Care treatment only and that you may be released before all your medical problems are known or treated. You, the patient, will arrange for follow up care as instructed.  Follow up with your PCP or specialty clinic as directed in the next 1-2 weeks if not improved or as needed.  You can call [phone] to schedule an appointment with the appropriate provider.  If your condition worsens we recommend that you receive another evaluation at the emergency room immediately or contact your primary medical clinics after hours call service to discuss your concerns.  Please return here or go to the Emergency Department for any concerns or worsening of condition.

## 2019-02-20 NOTE — PATIENT INSTRUCTIONS
Pharyngitis: Strep (Confirmed)    You have had a positive test for strep throat. Strep throat is a contagious illness. It is spread by coughing, kissing or by touching others after touching your mouth or nose. Symptoms include throat pain that is worse with swallowing, aching all over, headache, and fever. It is treated with antibiotic medicine. This should help you start to feel better in 1 to 2 days.  Home care  · Rest at home. Drink plenty of fluids to you won't get dehydrated.  · No work or school for the first 2 days of taking the antibiotics. After this time, you will not be contagious. You can then return to school or work if you are feeling better.   · Take antibiotic medicine for the full 10 days, even if you feel better. This is very important to ensure the infection is treated. It is also important to prevent medicine-resistant germs from developing. If you were given an antibiotic shot, you don't need any more antibiotics.  · You may use acetaminophen or ibuprofen to control pain or fever, unless another medicine was prescribed for this. Talk with your doctor before taking these medicines if you have chronic liver or kidney disease. Also talk with your doctor if you have had a stomach ulcer or GI bleeding.  · Throat lozenges or sprays help reduce pain. Gargling with warm saltwater will also reduce throat pain. Dissolve 1/2 teaspoon of salt in 1 glass of warm water. This may be useful just before meals.   · Soft foods are OK. Avoid salty or spicy foods.  Follow-up care  Follow up with your healthcare provider or our staff if you don't get better over the next week.  When to seek medical advice  Call your healthcare provider right away if any of these occur:  · Fever of 100.4ºF (38ºC) or higher, or as directed by your healthcare provider  · New or worsening ear pain, sinus pain, or headache  · Painful lumps in the back of neck  · Stiff neck  · Lymph nodes getting larger or becoming soft in the  middle  · You can't swallow liquids or you can't open your mouth wide because of throat pain  · Signs of dehydration. These include very dark urine or no urine, sunken eyes, and dizziness.  · Trouble breathing or noisy breathing  · Muffled voice  · Rash  Date Last Reviewed: 4/13/2015  © 3362-3079 [employer]. [address] [place] [address], [place] [address]. All rights reserved. This information is not intended as a substitute for professional medical care. Always follow your healthcare professional's instructions.

## 2019-03-09 ENCOUNTER — OFFICE VISIT (OUTPATIENT)
Dept: URGENT CARE | Facility: CLINIC | Age: 44
End: 2019-03-09
Payer: COMMERCIAL

## 2019-03-09 VITALS
WEIGHT: 262 LBS | OXYGEN SATURATION: 99 % | TEMPERATURE: 99 F | BODY MASS INDEX: 30.31 KG/M2 | DIASTOLIC BLOOD PRESSURE: 96 MMHG | RESPIRATION RATE: 16 BRPM | SYSTOLIC BLOOD PRESSURE: 161 MMHG | HEART RATE: 71 BPM | HEIGHT: 78 IN

## 2019-03-09 DIAGNOSIS — K04.7 DENTAL INFECTION: Primary | ICD-10-CM

## 2019-03-09 PROCEDURE — 99214 OFFICE O/P EST MOD 30 MIN: CPT | Mod: 25,S$GLB,, | Performed by: PHYSICIAN ASSISTANT

## 2019-03-09 PROCEDURE — 3008F PR BODY MASS INDEX (BMI) DOCUMENTED: ICD-10-PCS | Mod: CPTII,S$GLB,, | Performed by: PHYSICIAN ASSISTANT

## 2019-03-09 PROCEDURE — 96372 PR INJECTION,THERAP/PROPH/DIAG2ST, IM OR SUBCUT: ICD-10-PCS | Mod: S$GLB,,, | Performed by: PHYSICIAN ASSISTANT

## 2019-03-09 PROCEDURE — 99214 PR OFFICE/OUTPT VISIT, EST, LEVL IV, 30-39 MIN: ICD-10-PCS | Mod: 25,S$GLB,, | Performed by: PHYSICIAN ASSISTANT

## 2019-03-09 PROCEDURE — 3008F BODY MASS INDEX DOCD: CPT | Mod: CPTII,S$GLB,, | Performed by: PHYSICIAN ASSISTANT

## 2019-03-09 PROCEDURE — 96372 THER/PROPH/DIAG INJ SC/IM: CPT | Mod: S$GLB,,, | Performed by: PHYSICIAN ASSISTANT

## 2019-03-09 RX ORDER — KETOROLAC TROMETHAMINE 30 MG/ML
30 INJECTION, SOLUTION INTRAMUSCULAR; INTRAVENOUS
Status: COMPLETED | OUTPATIENT
Start: 2019-03-09 | End: 2019-03-09

## 2019-03-09 RX ORDER — CLINDAMYCIN HYDROCHLORIDE 300 MG/1
300 CAPSULE ORAL 3 TIMES DAILY
Qty: 21 CAPSULE | Refills: 0 | Status: SHIPPED | OUTPATIENT
Start: 2019-03-09 | End: 2019-03-16

## 2019-03-09 RX ADMIN — KETOROLAC TROMETHAMINE 30 MG: 30 INJECTION, SOLUTION INTRAMUSCULAR; INTRAVENOUS at 02:03

## 2019-03-09 NOTE — PROGRESS NOTES
"Subjective:       Patient ID: Tabitha Fairchild is a 43 y.o. male.    Vitals:  height is 6' 6" (1.981 m) and weight is 118.8 kg (262 lb). His temperature is 99 °F (37.2 °C). His blood pressure is 161/96 (abnormal) and his pulse is 71. His respiration is 16 and oxygen saturation is 99%.     Chief Complaint: Jaw Pain    Pt c/o upper left sided jaw pain near his wisdom teeth and left sided neck pain x 3 days.      Dental Pain    This is a new problem. The current episode started in the past 7 days. The problem has been unchanged. Pertinent negatives include no fever.       Constitution: Negative for chills, fatigue and fever.   HENT: Negative for congestion and sore throat.    Neck: Negative for painful lymph nodes.   Cardiovascular: Negative for chest pain and leg swelling.   Eyes: Negative for double vision and blurred vision.   Respiratory: Negative for cough and shortness of breath.    Gastrointestinal: Negative for nausea, vomiting and diarrhea.   Genitourinary: Negative for dysuria, frequency and urgency.   Musculoskeletal: Negative for joint pain, joint swelling, muscle cramps and muscle ache.   Skin: Negative for color change, pale, rash and erythema.   Allergic/Immunologic: Negative for seasonal allergies.   Neurological: Negative for dizziness, history of vertigo, light-headedness, passing out and headaches.   Hematologic/Lymphatic: Negative for swollen lymph nodes, easy bruising/bleeding and history of blood clots. Does not bruise/bleed easily.   Psychiatric/Behavioral: Negative for nervous/anxious, sleep disturbance and depression. The patient is not nervous/anxious.        Objective:      Physical Exam   Constitutional: He is oriented to person, place, and time. He appears well-developed and well-nourished.   HENT:   Head: Normocephalic and atraumatic.   Right Ear: External ear normal.   Left Ear: External ear normal.   Nose: Nose normal.   Mouth/Throat: Oropharynx is clear and moist.   Erythema and swelling " left upper soft palate near molar   Eyes: Conjunctivae, EOM and lids are normal.   Neck: Trachea normal, full passive range of motion without pain and phonation normal. Neck supple.   Cardiovascular: Normal rate, regular rhythm and normal heart sounds.   No murmur heard.  Musculoskeletal: Normal range of motion.   Neurological: He is alert and oriented to person, place, and time.   Skin: Skin is warm, dry and intact. No rash noted. No erythema.   Psychiatric: He has a normal mood and affect. His speech is normal and behavior is normal. Judgment and thought content normal. Cognition and memory are normal.   Nursing note and vitals reviewed.      Assessment:       1. Dental infection        Plan:         Dental infection    Other orders  -     clindamycin (CLEOCIN) 300 MG capsule; Take 1 capsule (300 mg total) by mouth 3 (three) times daily. for 7 days  Dispense: 21 capsule; Refill: 0  -     ketorolac injection 30 mg     Patient has known impacted wisdom tooth.  Has dental appointment Tuesday but, but began with severe pain that is not responsive to ibuprofen.  Has erythema and swelling and soft palate area near wisdom tooth.  Patient is allergic to penicillin.  Will treat with clindamycin, explained the importance of taking a probiotic with the specific antibiotic.  Will also give Toradol injection for pain.    At 10pm can start with 800mg Ibuprofen every 8 hours as needed  VERY important to take a probiotic with this antibiotic.    You must understand that you've received an Urgent Care treatment only and that you may be released before all your medical problems are known or treated. You, the patient, will arrange for follow up care as instructed.  Follow up with your PCP or specialty clinic as directed in the next 1-2 weeks if not improved or as needed.  You can call [phone] to schedule an appointment with the appropriate provider.  If your condition worsens we recommend that you receive another evaluation  at the emergency room immediately or contact your primary medical clinics after hours call service to discuss your concerns.  Please return here or go to the Emergency Department for any concerns or worsening of condition.

## 2019-03-09 NOTE — PATIENT INSTRUCTIONS
At 10pm can start with 800mg Ibuprofen every 8 hours as needed  VERY important to take a probiotic with this antibiotic.    You must understand that you've received an Urgent Care treatment only and that you may be released before all your medical problems are known or treated. You, the patient, will arrange for follow up care as instructed.  Follow up with your PCP or specialty clinic as directed in the next 1-2 weeks if not improved or as needed.  You can call [phone] to schedule an appointment with the appropriate provider.  If your condition worsens we recommend that you receive another evaluation at the emergency room immediately or contact your primary medical clinics after hours call service to discuss your concerns.  Please return here or go to the Emergency Department for any concerns or worsening of condition.

## 2019-03-12 ENCOUNTER — TELEPHONE (OUTPATIENT)
Dept: URGENT CARE | Facility: CLINIC | Age: 44
End: 2019-03-12

## 2019-12-10 ENCOUNTER — OFFICE VISIT (OUTPATIENT)
Dept: URGENT CARE | Facility: CLINIC | Age: 44
End: 2019-12-10
Payer: COMMERCIAL

## 2019-12-10 VITALS
DIASTOLIC BLOOD PRESSURE: 91 MMHG | SYSTOLIC BLOOD PRESSURE: 135 MMHG | WEIGHT: 262 LBS | OXYGEN SATURATION: 99 % | BODY MASS INDEX: 30.31 KG/M2 | TEMPERATURE: 98 F | HEIGHT: 78 IN | HEART RATE: 71 BPM | RESPIRATION RATE: 16 BRPM

## 2019-12-10 DIAGNOSIS — R05.9 COUGH: Primary | ICD-10-CM

## 2019-12-10 LAB
CTP QC/QA: YES
FLUAV AG NPH QL: NEGATIVE
FLUBV AG NPH QL: NEGATIVE

## 2019-12-10 PROCEDURE — 99214 OFFICE O/P EST MOD 30 MIN: CPT | Mod: S$GLB,,, | Performed by: PHYSICIAN ASSISTANT

## 2019-12-10 PROCEDURE — 87804 POCT INFLUENZA A/B: ICD-10-PCS | Mod: QW,S$GLB,, | Performed by: PHYSICIAN ASSISTANT

## 2019-12-10 PROCEDURE — 99214 PR OFFICE/OUTPT VISIT, EST, LEVL IV, 30-39 MIN: ICD-10-PCS | Mod: S$GLB,,, | Performed by: PHYSICIAN ASSISTANT

## 2019-12-10 PROCEDURE — 87804 INFLUENZA ASSAY W/OPTIC: CPT | Mod: QW,S$GLB,, | Performed by: PHYSICIAN ASSISTANT

## 2019-12-10 RX ORDER — ONDANSETRON 4 MG/1
4 TABLET, FILM COATED ORAL EVERY 8 HOURS PRN
Qty: 30 TABLET | Refills: 0 | Status: SHIPPED | OUTPATIENT
Start: 2019-12-10

## 2019-12-10 RX ORDER — BENZONATATE 100 MG/1
100 CAPSULE ORAL EVERY 6 HOURS PRN
Qty: 60 CAPSULE | Refills: 1 | Status: SHIPPED | OUTPATIENT
Start: 2019-12-10 | End: 2020-12-09

## 2019-12-10 RX ORDER — FLUTICASONE PROPIONATE 50 MCG
1 SPRAY, SUSPENSION (ML) NASAL 2 TIMES DAILY
Qty: 1 BOTTLE | Refills: 0 | Status: SHIPPED | OUTPATIENT
Start: 2019-12-10

## 2019-12-10 NOTE — PROGRESS NOTES
"Subjective:       Patient ID: Tabitha Fairchild is a 44 y.o. male.    Vitals:  height is 6' 6" (1.981 m) and weight is 118.8 kg (262 lb). His tympanic temperature is 98.3 °F (36.8 °C). His blood pressure is 135/91 (abnormal) and his pulse is 71. His respiration is 16 and oxygen saturation is 99%.     Chief Complaint: URI    Patient symptoms started yesterday.  He has taken tylenol for this issue with no relief    URI    This is a new problem. The current episode started yesterday. The problem has been gradually worsening. There has been no fever. Associated symptoms include congestion, coughing, headaches, nausea and a sore throat. Pertinent negatives include no ear pain, rash, sinus pain, vomiting or wheezing. He has tried acetaminophen for the symptoms. The treatment provided no relief.       Constitution: Positive for chills and fatigue. Negative for sweating and fever.   HENT: Positive for congestion, postnasal drip and sore throat. Negative for ear pain, sinus pain, sinus pressure and voice change.    Neck: Negative for painful lymph nodes.   Eyes: Negative for eye redness.   Respiratory: Positive for cough. Negative for chest tightness, sputum production, bloody sputum, COPD, shortness of breath, stridor, wheezing and asthma.    Gastrointestinal: Positive for nausea. Negative for vomiting.   Musculoskeletal: Positive for muscle ache.   Skin: Negative for rash.   Allergic/Immunologic: Negative for seasonal allergies and asthma.   Neurological: Positive for headaches.   Hematologic/Lymphatic: Negative for swollen lymph nodes.       Objective:      Physical Exam   Constitutional: He is oriented to person, place, and time. He appears well-developed and well-nourished. He is cooperative.  Non-toxic appearance. He does not have a sickly appearance. He does not appear ill. No distress.   HENT:   Head: Normocephalic and atraumatic.   Right Ear: Hearing, tympanic membrane, external ear and ear canal normal.   Left Ear: " Hearing, tympanic membrane, external ear and ear canal normal.   Nose: Nose normal. No mucosal edema, rhinorrhea or nasal deformity. No epistaxis. Right sinus exhibits no maxillary sinus tenderness and no frontal sinus tenderness. Left sinus exhibits no maxillary sinus tenderness and no frontal sinus tenderness.   Mouth/Throat: Uvula is midline and mucous membranes are normal. No trismus in the jaw. Normal dentition. No uvula swelling. Posterior oropharyngeal erythema present. No oropharyngeal exudate or posterior oropharyngeal edema. No tonsillar exudate.   Eyes: Conjunctivae and lids are normal. No scleral icterus.   Neck: Trachea normal, full passive range of motion without pain and phonation normal. Neck supple. No neck rigidity. No edema and no erythema present.   Cardiovascular: Normal rate, regular rhythm, normal heart sounds, intact distal pulses and normal pulses.   Pulmonary/Chest: Effort normal and breath sounds normal. No respiratory distress. He has no decreased breath sounds. He has no rhonchi.   Abdominal: Normal appearance.   Musculoskeletal: Normal range of motion. He exhibits no edema or deformity.   Neurological: He is alert and oriented to person, place, and time. He exhibits normal muscle tone. Coordination normal.   Skin: Skin is warm, dry, intact, not diaphoretic and not pale.   Psychiatric: He has a normal mood and affect. His speech is normal and behavior is normal. Judgment and thought content normal. Cognition and memory are normal.   Nursing note and vitals reviewed.        Assessment:       1. Cough        Plan:         Cough  -     POCT Influenza A/B    Other orders  -     benzonatate (TESSALON PERLES) 100 MG capsule; Take 1 capsule (100 mg total) by mouth every 6 (six) hours as needed.  Dispense: 60 capsule; Refill: 1  -     fluticasone propionate (FLONASE ALLERGY RELIEF) 50 mcg/actuation nasal spray; 1 spray (50 mcg total) by Each Nostril route 2 (two) times daily.  Dispense: 1  Bottle; Refill: 0  -     ondansetron (ZOFRAN) 4 MG tablet; Take 1 tablet (4 mg total) by mouth every 8 (eight) hours as needed.  Dispense: 30 tablet; Refill: 0  -     baloxavir marboxil (XOFLUZA) 40 mg Tab; Take 80 mg by mouth once. for 1 dose  Dispense: 2 tablet; Refill: 0    Symptomatic treatment:    Alternate Tylenol and Ibuprofen every 3 hrs  salt water gargles to soothe throat  Honey/lemon water to soothe throat  Cold-eeze helps to reduce the duration of URI symptoms  Elderberry to reduce duration of URI symptoms  Cepachol helps to numb the discomfort in throat  Nasal saline spray reduces inflammation and dryness  Warm face compresses/hot showers as often as you can to open sinuses   Vicks vapor rub at night  Flonase OTC or Nasacort OTC  Simple foods like chicken noodle soup help hydrate  Delsym helps with coughing at night  Zyrtec/Claritin during the day and Benadryl at night may help if allergy component   Zantac will help if there is reflux from the post nasal drip  Rest as much as you can  Your symptoms will likely last 7-10 days, maybe longer depending on how it affects your body.  You are contagious 7-10, so minimize contact with others to reduce the spread to others and stay home from work or school as we discussed. Dehydration is preventable but is one of the main reasons why you will feel so badly. Drink pedialyte, gatorade or propel. Stay hydrated.  Antibiotics are not needed unless a complication(such as Otitis Media, Bacterial sinus infection or pneumonia). Taking antibiotics for Flu/Cold is not supported by evidencebased medicine and can expose you to unnecessary side effects of the medication, such as anaphylaxis.   If you experience any:  Chest pain, shortness of breath, wheezing or difficulty breathing  Severe headache, face, neck or ear pain  New rash  Fever over 101.5º F (38.6 C) for more than three days  Confusion, behavior change or seizure  Severe weakness or dizziness  Go to ER

## 2020-10-05 ENCOUNTER — PATIENT MESSAGE (OUTPATIENT)
Dept: ADMINISTRATIVE | Facility: HOSPITAL | Age: 45
End: 2020-10-05

## 2020-11-18 ENCOUNTER — CLINICAL SUPPORT (OUTPATIENT)
Dept: URGENT CARE | Facility: CLINIC | Age: 45
End: 2020-11-18
Payer: COMMERCIAL

## 2020-11-18 VITALS
TEMPERATURE: 98 F | HEART RATE: 70 BPM | SYSTOLIC BLOOD PRESSURE: 153 MMHG | DIASTOLIC BLOOD PRESSURE: 94 MMHG | OXYGEN SATURATION: 98 %

## 2020-11-18 DIAGNOSIS — Z03.818 ENCNTR FOR OBS FOR SUSP EXPSR TO OTH BIOLG AGENTS RULED OUT: Primary | ICD-10-CM

## 2020-11-18 LAB
CTP QC/QA: YES
SARS-COV-2 RDRP RESP QL NAA+PROBE: NEGATIVE

## 2020-11-18 PROCEDURE — U0002: ICD-10-PCS | Mod: QW,S$GLB,, | Performed by: PHYSICIAN ASSISTANT

## 2020-11-18 PROCEDURE — U0002 COVID-19 LAB TEST NON-CDC: HCPCS | Mod: QW,S$GLB,, | Performed by: PHYSICIAN ASSISTANT

## 2020-11-18 NOTE — PROGRESS NOTES
"Patient presents to clinic for Covid testing asymptomatic.     NEGATIVE COVID TEST    You have tested negative for COVID-19 today.  If you did not have a close exposure (as defined below) you can return to your normal daily activities to include social distancing, wearing a mask and frequent handwashing.     A "close exposure" is defined as anyone who has had an exposure (masked or unmasked) to a known COVID -19 positive person within 6 ft for longer than 15 minutes. If your exposure meets this definition you are required by CDC guidelines to quarantine for 14 days from time of exposure. CDC now states that a test can be performed for an asymptomatic patient (someone who does not have any symptoms) after a close exposure, and that a test should be done if you develop symptoms after a close exposure as described above.    If you developed symptoms since the exposure, and your test was negative today, you still have to quarantine for 14 days from the date of the exposure.    The 14-day quarantine begins from the day you were exposed, not the day of your test.  For example, if your exposure was on a Monday, and you waited until Friday of the same week to get tested and it was negative, your 14-day quarantine begins from that Monday, not the Friday you tested negative.    So, if you meet the definition of a close exposure, it will not matter whether you are experiencing symptoms-quarantine for 14 days after close exposure is required by CDC guidelines regardless of test status- a negative test does not shorten the quarantine period.          "

## 2021-01-04 ENCOUNTER — PATIENT MESSAGE (OUTPATIENT)
Dept: ADMINISTRATIVE | Facility: HOSPITAL | Age: 46
End: 2021-01-04

## 2021-04-05 ENCOUNTER — PATIENT MESSAGE (OUTPATIENT)
Dept: ADMINISTRATIVE | Facility: HOSPITAL | Age: 46
End: 2021-04-05